# Patient Record
Sex: FEMALE | Race: WHITE | NOT HISPANIC OR LATINO | Employment: OTHER | URBAN - METROPOLITAN AREA
[De-identification: names, ages, dates, MRNs, and addresses within clinical notes are randomized per-mention and may not be internally consistent; named-entity substitution may affect disease eponyms.]

---

## 2018-12-05 ENCOUNTER — HOSPITAL ENCOUNTER (INPATIENT)
Facility: CLINIC | Age: 83
Setting detail: SURGERY ADMIT
End: 2018-12-05
Attending: ORTHOPAEDIC SURGERY | Admitting: ORTHOPAEDIC SURGERY
Payer: MEDICARE

## 2021-01-01 DIAGNOSIS — Z11.59 ENCOUNTER FOR SCREENING FOR OTHER VIRAL DISEASES: Primary | ICD-10-CM

## 2021-01-11 RX ORDER — IPRATROPIUM BROMIDE AND ALBUTEROL 20; 100 UG/1; UG/1
1 SPRAY, METERED RESPIRATORY (INHALATION) 4 TIMES DAILY
COMMUNITY
End: 2024-08-17

## 2021-01-11 RX ORDER — LOSARTAN POTASSIUM AND HYDROCHLOROTHIAZIDE 12.5; 1 MG/1; MG/1
1 TABLET ORAL DAILY
COMMUNITY

## 2021-01-11 RX ORDER — ALENDRONATE SODIUM 70 MG/1
70 TABLET ORAL
COMMUNITY
End: 2024-08-17

## 2021-01-12 ASSESSMENT — MIFFLIN-ST. JEOR: SCORE: 1005.87

## 2021-01-16 ENCOUNTER — HOSPITAL ENCOUNTER (OUTPATIENT)
Dept: LAB | Facility: CLINIC | Age: 86
Discharge: HOME | End: 2021-01-16
Attending: ORTHOPAEDIC SURGERY | Admitting: ORTHOPAEDIC SURGERY
Payer: MEDICARE

## 2021-01-16 DIAGNOSIS — Z11.59 ENCOUNTER FOR SCREENING FOR OTHER VIRAL DISEASES: ICD-10-CM

## 2021-01-16 LAB
SARS-COV-2 RNA RESP QL NAA+PROBE: NORMAL
SPECIMEN SOURCE: NORMAL

## 2021-01-16 PROCEDURE — 87635 SARS-COV-2 COVID-19 AMP PRB: CPT | Performed by: ORTHOPAEDIC SURGERY

## 2021-01-17 LAB
LABORATORY COMMENT REPORT: NORMAL
SARS-COV-2 RNA RESP QL NAA+PROBE: NEGATIVE
SPECIMEN SOURCE: NORMAL

## 2021-01-19 ENCOUNTER — HOSPITAL ENCOUNTER (OUTPATIENT)
Facility: CLINIC | Age: 86
Discharge: HOME | End: 2021-01-20
Attending: ORTHOPAEDIC SURGERY | Admitting: ORTHOPAEDIC SURGERY
Payer: MEDICARE

## 2021-01-19 ENCOUNTER — APPOINTMENT (OUTPATIENT)
Dept: GENERAL RADIOLOGY | Facility: CLINIC | Age: 86
End: 2021-01-19
Attending: ORTHOPAEDIC SURGERY
Payer: MEDICARE

## 2021-01-19 ENCOUNTER — ANESTHESIA (OUTPATIENT)
Dept: SURGERY | Facility: CLINIC | Age: 86
End: 2021-01-19
Payer: MEDICARE

## 2021-01-19 ENCOUNTER — ANESTHESIA EVENT (OUTPATIENT)
Dept: SURGERY | Facility: CLINIC | Age: 86
End: 2021-01-19
Payer: MEDICARE

## 2021-01-19 DIAGNOSIS — M19.011 PRIMARY OSTEOARTHRITIS OF RIGHT SHOULDER: Primary | ICD-10-CM

## 2021-01-19 LAB
ABO + RH BLD: NORMAL
ABO + RH BLD: NORMAL
BLD GP AB SCN SERPL QL: NORMAL
BLOOD BANK CMNT PATIENT-IMP: NORMAL
SPECIMEN EXP DATE BLD: NORMAL

## 2021-01-19 PROCEDURE — 86850 RBC ANTIBODY SCREEN: CPT | Performed by: ANESTHESIOLOGY

## 2021-01-19 PROCEDURE — C1713 ANCHOR/SCREW BN/BN,TIS/BN: HCPCS | Performed by: ORTHOPAEDIC SURGERY

## 2021-01-19 PROCEDURE — 258N000003 HC RX IP 258 OP 636: Performed by: ORTHOPAEDIC SURGERY

## 2021-01-19 PROCEDURE — 999N000156 HC STATISTIC RCP CONSULT EA 30 MIN

## 2021-01-19 PROCEDURE — C1776 JOINT DEVICE (IMPLANTABLE): HCPCS | Performed by: ORTHOPAEDIC SURGERY

## 2021-01-19 PROCEDURE — 999N000141 HC STATISTIC PRE-PROCEDURE NURSING ASSESSMENT: Performed by: ORTHOPAEDIC SURGERY

## 2021-01-19 PROCEDURE — 86901 BLOOD TYPING SEROLOGIC RH(D): CPT | Performed by: ANESTHESIOLOGY

## 2021-01-19 PROCEDURE — 710N000009 HC RECOVERY PHASE 1, LEVEL 1, PER MIN: Performed by: ORTHOPAEDIC SURGERY

## 2021-01-19 PROCEDURE — 250N000011 HC RX IP 250 OP 636: Performed by: ANESTHESIOLOGY

## 2021-01-19 PROCEDURE — 250N000011 HC RX IP 250 OP 636: Performed by: PHYSICIAN ASSISTANT

## 2021-01-19 PROCEDURE — 999N000179 XR SURGERY CARM FLUORO LESS THAN 5 MIN W STILLS: Mod: TC

## 2021-01-19 PROCEDURE — 258N000003 HC RX IP 258 OP 636: Performed by: ANESTHESIOLOGY

## 2021-01-19 PROCEDURE — 36415 COLL VENOUS BLD VENIPUNCTURE: CPT | Performed by: ANESTHESIOLOGY

## 2021-01-19 PROCEDURE — 250N000009 HC RX 250: Performed by: NURSE ANESTHETIST, CERTIFIED REGISTERED

## 2021-01-19 PROCEDURE — 272N000001 HC OR GENERAL SUPPLY STERILE: Performed by: ORTHOPAEDIC SURGERY

## 2021-01-19 PROCEDURE — 370N000017 HC ANESTHESIA TECHNICAL FEE, PER MIN: Performed by: ORTHOPAEDIC SURGERY

## 2021-01-19 PROCEDURE — 250N000013 HC RX MED GY IP 250 OP 250 PS 637: Mod: GY | Performed by: ANESTHESIOLOGY

## 2021-01-19 PROCEDURE — 250N000013 HC RX MED GY IP 250 OP 250 PS 637: Performed by: PHYSICIAN ASSISTANT

## 2021-01-19 PROCEDURE — 271N000001 HC OR GENERAL SUPPLY NON-STERILE: Performed by: ORTHOPAEDIC SURGERY

## 2021-01-19 PROCEDURE — 86900 BLOOD TYPING SEROLOGIC ABO: CPT | Performed by: ANESTHESIOLOGY

## 2021-01-19 PROCEDURE — 360N000084 HC SURGERY LEVEL 4 W/ FLUORO, PER MIN: Performed by: ORTHOPAEDIC SURGERY

## 2021-01-19 PROCEDURE — 250N000013 HC RX MED GY IP 250 OP 250 PS 637: Mod: GY | Performed by: ORTHOPAEDIC SURGERY

## 2021-01-19 PROCEDURE — 250N000011 HC RX IP 250 OP 636: Performed by: NURSE ANESTHETIST, CERTIFIED REGISTERED

## 2021-01-19 PROCEDURE — 250N000009 HC RX 250: Performed by: ANESTHESIOLOGY

## 2021-01-19 PROCEDURE — 250N000011 HC RX IP 250 OP 636: Performed by: ORTHOPAEDIC SURGERY

## 2021-01-19 DEVICE — IMPLANTABLE DEVICE
Type: IMPLANTABLE DEVICE | Site: SHOULDER | Status: FUNCTIONAL
Brand: COMPREHENSIVE® REVERSE SHOULDER

## 2021-01-19 DEVICE — IMPLANTABLE DEVICE
Type: IMPLANTABLE DEVICE | Site: SHOULDER | Status: FUNCTIONAL
Brand: COMPREHENSIVE REVERSE SHOULDER

## 2021-01-19 RX ORDER — ALBUTEROL SULFATE 90 UG/1
1-2 AEROSOL, METERED RESPIRATORY (INHALATION) EVERY 4 HOURS PRN
Status: DISCONTINUED | OUTPATIENT
Start: 2021-01-19 | End: 2021-01-20 | Stop reason: HOSPADM

## 2021-01-19 RX ORDER — BISACODYL 10 MG
10 SUPPOSITORY, RECTAL RECTAL DAILY PRN
Status: DISCONTINUED | OUTPATIENT
Start: 2021-01-19 | End: 2021-01-20 | Stop reason: HOSPADM

## 2021-01-19 RX ORDER — SODIUM CHLORIDE, SODIUM LACTATE, POTASSIUM CHLORIDE, CALCIUM CHLORIDE 600; 310; 30; 20 MG/100ML; MG/100ML; MG/100ML; MG/100ML
INJECTION, SOLUTION INTRAVENOUS CONTINUOUS
Status: DISCONTINUED | OUTPATIENT
Start: 2021-01-19 | End: 2021-01-19 | Stop reason: HOSPADM

## 2021-01-19 RX ORDER — FENTANYL CITRATE 50 UG/ML
25-50 INJECTION, SOLUTION INTRAMUSCULAR; INTRAVENOUS
Status: DISCONTINUED | OUTPATIENT
Start: 2021-01-19 | End: 2021-01-19 | Stop reason: HOSPADM

## 2021-01-19 RX ORDER — NALOXONE HYDROCHLORIDE 0.4 MG/ML
0.4 INJECTION, SOLUTION INTRAMUSCULAR; INTRAVENOUS; SUBCUTANEOUS
Status: DISCONTINUED | OUTPATIENT
Start: 2021-01-19 | End: 2021-01-20 | Stop reason: HOSPADM

## 2021-01-19 RX ORDER — NALOXONE HYDROCHLORIDE 0.4 MG/ML
0.4 INJECTION, SOLUTION INTRAMUSCULAR; INTRAVENOUS; SUBCUTANEOUS
Status: DISCONTINUED | OUTPATIENT
Start: 2021-01-19 | End: 2021-01-19 | Stop reason: HOSPADM

## 2021-01-19 RX ORDER — ONDANSETRON 2 MG/ML
4 INJECTION INTRAMUSCULAR; INTRAVENOUS EVERY 6 HOURS PRN
Status: DISCONTINUED | OUTPATIENT
Start: 2021-01-19 | End: 2021-01-20 | Stop reason: HOSPADM

## 2021-01-19 RX ORDER — TRANEXAMIC ACID 650 MG/1
1950 TABLET ORAL ONCE
Status: COMPLETED | OUTPATIENT
Start: 2021-01-19 | End: 2021-01-19

## 2021-01-19 RX ORDER — CELECOXIB 200 MG/1
200 CAPSULE ORAL DAILY
Qty: 14 CAPSULE | Refills: 0 | Status: SHIPPED | OUTPATIENT
Start: 2021-01-19 | End: 2024-08-17

## 2021-01-19 RX ORDER — PROCHLORPERAZINE MALEATE 5 MG
5 TABLET ORAL EVERY 6 HOURS PRN
Status: DISCONTINUED | OUTPATIENT
Start: 2021-01-19 | End: 2021-01-20 | Stop reason: HOSPADM

## 2021-01-19 RX ORDER — FENTANYL CITRATE-0.9 % NACL/PF 10 MCG/ML
PLASTIC BAG, INJECTION (ML) INTRAVENOUS CONTINUOUS PRN
Status: DISCONTINUED | OUTPATIENT
Start: 2021-01-19 | End: 2021-01-19

## 2021-01-19 RX ORDER — VANCOMYCIN HYDROCHLORIDE 1 G/20ML
INJECTION, POWDER, LYOPHILIZED, FOR SOLUTION INTRAVENOUS PRN
Status: DISCONTINUED | OUTPATIENT
Start: 2021-01-19 | End: 2021-01-19 | Stop reason: HOSPADM

## 2021-01-19 RX ORDER — ALBUTEROL SULFATE 0.83 MG/ML
2.5 SOLUTION RESPIRATORY (INHALATION) EVERY 4 HOURS PRN
Status: DISCONTINUED | OUTPATIENT
Start: 2021-01-19 | End: 2021-01-19 | Stop reason: HOSPADM

## 2021-01-19 RX ORDER — LIDOCAINE HYDROCHLORIDE 40 MG/ML
SOLUTION TOPICAL PRN
Status: DISCONTINUED | OUTPATIENT
Start: 2021-01-19 | End: 2021-01-19

## 2021-01-19 RX ORDER — LABETALOL 20 MG/4 ML (5 MG/ML) INTRAVENOUS SYRINGE
10
Status: DISCONTINUED | OUTPATIENT
Start: 2021-01-19 | End: 2021-01-19 | Stop reason: HOSPADM

## 2021-01-19 RX ORDER — LIDOCAINE 40 MG/G
CREAM TOPICAL
Status: DISCONTINUED | OUTPATIENT
Start: 2021-01-19 | End: 2021-01-19 | Stop reason: HOSPADM

## 2021-01-19 RX ORDER — DOCUSATE SODIUM 100 MG/1
100 CAPSULE, LIQUID FILLED ORAL 2 TIMES DAILY
Status: DISCONTINUED | OUTPATIENT
Start: 2021-01-19 | End: 2021-01-20 | Stop reason: HOSPADM

## 2021-01-19 RX ORDER — ONDANSETRON 2 MG/ML
4 INJECTION INTRAMUSCULAR; INTRAVENOUS EVERY 30 MIN PRN
Status: DISCONTINUED | OUTPATIENT
Start: 2021-01-19 | End: 2021-01-19 | Stop reason: HOSPADM

## 2021-01-19 RX ORDER — GABAPENTIN 100 MG/1
100-200 CAPSULE ORAL
Status: DISCONTINUED | OUTPATIENT
Start: 2021-01-19 | End: 2021-01-20 | Stop reason: HOSPADM

## 2021-01-19 RX ORDER — HYDRALAZINE HYDROCHLORIDE 20 MG/ML
2.5-5 INJECTION INTRAMUSCULAR; INTRAVENOUS EVERY 10 MIN PRN
Status: DISCONTINUED | OUTPATIENT
Start: 2021-01-19 | End: 2021-01-19 | Stop reason: HOSPADM

## 2021-01-19 RX ORDER — AMOXICILLIN 250 MG
1-2 CAPSULE ORAL 2 TIMES DAILY
Qty: 30 TABLET | Refills: 0 | Status: SHIPPED | OUTPATIENT
Start: 2021-01-19

## 2021-01-19 RX ORDER — GLYCOPYRROLATE 0.2 MG/ML
INJECTION, SOLUTION INTRAMUSCULAR; INTRAVENOUS PRN
Status: DISCONTINUED | OUTPATIENT
Start: 2021-01-19 | End: 2021-01-19

## 2021-01-19 RX ORDER — CEFAZOLIN SODIUM 2 G/100ML
2 INJECTION, SOLUTION INTRAVENOUS
Status: COMPLETED | OUTPATIENT
Start: 2021-01-19 | End: 2021-01-19

## 2021-01-19 RX ORDER — ACETAMINOPHEN 325 MG/1
650 TABLET ORAL EVERY 4 HOURS PRN
Qty: 100 TABLET | Refills: 0 | Status: SHIPPED | OUTPATIENT
Start: 2021-01-19 | End: 2024-08-17

## 2021-01-19 RX ORDER — ACETAMINOPHEN 325 MG/1
650 TABLET ORAL EVERY 4 HOURS PRN
Status: DISCONTINUED | OUTPATIENT
Start: 2021-01-22 | End: 2021-01-20 | Stop reason: HOSPADM

## 2021-01-19 RX ORDER — TRAMADOL HYDROCHLORIDE 50 MG/1
50 TABLET ORAL EVERY 6 HOURS PRN
Status: DISCONTINUED | OUTPATIENT
Start: 2021-01-19 | End: 2021-01-20 | Stop reason: HOSPADM

## 2021-01-19 RX ORDER — NALOXONE HYDROCHLORIDE 0.4 MG/ML
0.2 INJECTION, SOLUTION INTRAMUSCULAR; INTRAVENOUS; SUBCUTANEOUS
Status: DISCONTINUED | OUTPATIENT
Start: 2021-01-19 | End: 2021-01-20 | Stop reason: HOSPADM

## 2021-01-19 RX ORDER — CEFAZOLIN SODIUM 1 G/3ML
1 INJECTION, POWDER, FOR SOLUTION INTRAMUSCULAR; INTRAVENOUS EVERY 8 HOURS
Status: COMPLETED | OUTPATIENT
Start: 2021-01-19 | End: 2021-01-20

## 2021-01-19 RX ORDER — CLOPIDOGREL BISULFATE 75 MG/1
75 TABLET ORAL DAILY
Status: DISCONTINUED | OUTPATIENT
Start: 2021-01-19 | End: 2021-01-20 | Stop reason: HOSPADM

## 2021-01-19 RX ORDER — HYDROMORPHONE HYDROCHLORIDE 1 MG/ML
.3-.5 INJECTION, SOLUTION INTRAMUSCULAR; INTRAVENOUS; SUBCUTANEOUS EVERY 10 MIN PRN
Status: DISCONTINUED | OUTPATIENT
Start: 2021-01-19 | End: 2021-01-19 | Stop reason: HOSPADM

## 2021-01-19 RX ORDER — CELECOXIB 200 MG/1
200 CAPSULE ORAL ONCE
Status: COMPLETED | OUTPATIENT
Start: 2021-01-19 | End: 2021-01-19

## 2021-01-19 RX ORDER — SIMVASTATIN 40 MG
40 TABLET ORAL AT BEDTIME
Status: DISCONTINUED | OUTPATIENT
Start: 2021-01-19 | End: 2021-01-20 | Stop reason: HOSPADM

## 2021-01-19 RX ORDER — ONDANSETRON 4 MG/1
4 TABLET, ORALLY DISINTEGRATING ORAL EVERY 6 HOURS PRN
Status: DISCONTINUED | OUTPATIENT
Start: 2021-01-19 | End: 2021-01-20 | Stop reason: HOSPADM

## 2021-01-19 RX ORDER — ACETAMINOPHEN 325 MG/1
975 TABLET ORAL ONCE
Status: COMPLETED | OUTPATIENT
Start: 2021-01-19 | End: 2021-01-19

## 2021-01-19 RX ORDER — DEXAMETHASONE SODIUM PHOSPHATE 4 MG/ML
INJECTION, SOLUTION INTRA-ARTICULAR; INTRALESIONAL; INTRAMUSCULAR; INTRAVENOUS; SOFT TISSUE PRN
Status: DISCONTINUED | OUTPATIENT
Start: 2021-01-19 | End: 2021-01-19

## 2021-01-19 RX ORDER — HYDROXYZINE HYDROCHLORIDE 10 MG/1
10 TABLET, FILM COATED ORAL EVERY 6 HOURS PRN
Qty: 30 TABLET | Refills: 0 | Status: SHIPPED | OUTPATIENT
Start: 2021-01-19 | End: 2024-08-17

## 2021-01-19 RX ORDER — ALENDRONATE SODIUM 70 MG/1
70 TABLET ORAL
Status: DISCONTINUED | OUTPATIENT
Start: 2021-01-25 | End: 2021-01-20 | Stop reason: HOSPADM

## 2021-01-19 RX ORDER — LIDOCAINE HYDROCHLORIDE 10 MG/ML
INJECTION, SOLUTION INFILTRATION; PERINEURAL PRN
Status: DISCONTINUED | OUTPATIENT
Start: 2021-01-19 | End: 2021-01-19

## 2021-01-19 RX ORDER — AMOXICILLIN 250 MG
1 CAPSULE ORAL 2 TIMES DAILY
Status: DISCONTINUED | OUTPATIENT
Start: 2021-01-19 | End: 2021-01-20 | Stop reason: HOSPADM

## 2021-01-19 RX ORDER — ONDANSETRON 2 MG/ML
INJECTION INTRAMUSCULAR; INTRAVENOUS PRN
Status: DISCONTINUED | OUTPATIENT
Start: 2021-01-19 | End: 2021-01-19

## 2021-01-19 RX ORDER — TRAMADOL HYDROCHLORIDE 50 MG/1
50 TABLET ORAL EVERY 6 HOURS PRN
Qty: 30 TABLET | Refills: 0 | Status: SHIPPED | OUTPATIENT
Start: 2021-01-19

## 2021-01-19 RX ORDER — FENTANYL CITRATE 50 UG/ML
INJECTION, SOLUTION INTRAMUSCULAR; INTRAVENOUS PRN
Status: DISCONTINUED | OUTPATIENT
Start: 2021-01-19 | End: 2021-01-19

## 2021-01-19 RX ORDER — HYDROMORPHONE HYDROCHLORIDE 1 MG/ML
0.2 INJECTION, SOLUTION INTRAMUSCULAR; INTRAVENOUS; SUBCUTANEOUS
Status: DISCONTINUED | OUTPATIENT
Start: 2021-01-19 | End: 2021-01-20 | Stop reason: HOSPADM

## 2021-01-19 RX ORDER — BUPIVACAINE HYDROCHLORIDE 7.5 MG/ML
INJECTION, SOLUTION EPIDURAL; RETROBULBAR PRN
Status: DISCONTINUED | OUTPATIENT
Start: 2021-01-19 | End: 2021-01-19

## 2021-01-19 RX ORDER — PROPOFOL 10 MG/ML
INJECTION, EMULSION INTRAVENOUS PRN
Status: DISCONTINUED | OUTPATIENT
Start: 2021-01-19 | End: 2021-01-19

## 2021-01-19 RX ORDER — NALOXONE HYDROCHLORIDE 0.4 MG/ML
0.2 INJECTION, SOLUTION INTRAMUSCULAR; INTRAVENOUS; SUBCUTANEOUS
Status: DISCONTINUED | OUTPATIENT
Start: 2021-01-19 | End: 2021-01-19 | Stop reason: HOSPADM

## 2021-01-19 RX ORDER — MEPERIDINE HYDROCHLORIDE 25 MG/ML
12.5 INJECTION INTRAMUSCULAR; INTRAVENOUS; SUBCUTANEOUS
Status: DISCONTINUED | OUTPATIENT
Start: 2021-01-19 | End: 2021-01-19 | Stop reason: HOSPADM

## 2021-01-19 RX ORDER — POLYETHYLENE GLYCOL 3350 17 G/17G
17 POWDER, FOR SOLUTION ORAL DAILY
Status: DISCONTINUED | OUTPATIENT
Start: 2021-01-20 | End: 2021-01-20 | Stop reason: HOSPADM

## 2021-01-19 RX ORDER — ACETAMINOPHEN 325 MG/1
975 TABLET ORAL EVERY 8 HOURS
Status: DISCONTINUED | OUTPATIENT
Start: 2021-01-19 | End: 2021-01-20 | Stop reason: HOSPADM

## 2021-01-19 RX ORDER — SODIUM CHLORIDE, SODIUM LACTATE, POTASSIUM CHLORIDE, CALCIUM CHLORIDE 600; 310; 30; 20 MG/100ML; MG/100ML; MG/100ML; MG/100ML
INJECTION, SOLUTION INTRAVENOUS CONTINUOUS
Status: DISCONTINUED | OUTPATIENT
Start: 2021-01-19 | End: 2021-01-20

## 2021-01-19 RX ORDER — LIDOCAINE 40 MG/G
CREAM TOPICAL
Status: DISCONTINUED | OUTPATIENT
Start: 2021-01-19 | End: 2021-01-20 | Stop reason: HOSPADM

## 2021-01-19 RX ORDER — HYDROMORPHONE HYDROCHLORIDE 1 MG/ML
0.4 INJECTION, SOLUTION INTRAMUSCULAR; INTRAVENOUS; SUBCUTANEOUS
Status: DISCONTINUED | OUTPATIENT
Start: 2021-01-19 | End: 2021-01-20 | Stop reason: HOSPADM

## 2021-01-19 RX ORDER — CEFAZOLIN SODIUM 1 G/3ML
1 INJECTION, POWDER, FOR SOLUTION INTRAMUSCULAR; INTRAVENOUS SEE ADMIN INSTRUCTIONS
Status: DISCONTINUED | OUTPATIENT
Start: 2021-01-19 | End: 2021-01-19 | Stop reason: HOSPADM

## 2021-01-19 RX ORDER — ONDANSETRON 4 MG/1
4 TABLET, ORALLY DISINTEGRATING ORAL EVERY 30 MIN PRN
Status: DISCONTINUED | OUTPATIENT
Start: 2021-01-19 | End: 2021-01-19 | Stop reason: HOSPADM

## 2021-01-19 RX ORDER — OXYBUTYNIN CHLORIDE 5 MG/1
10 TABLET, EXTENDED RELEASE ORAL EVERY EVENING
Status: DISCONTINUED | OUTPATIENT
Start: 2021-01-19 | End: 2021-01-20 | Stop reason: HOSPADM

## 2021-01-19 RX ORDER — IPRATROPIUM BROMIDE AND ALBUTEROL SULFATE 2.5; .5 MG/3ML; MG/3ML
3 SOLUTION RESPIRATORY (INHALATION) ONCE
Status: COMPLETED | OUTPATIENT
Start: 2021-01-19 | End: 2021-01-19

## 2021-01-19 RX ORDER — HYDROXYZINE HYDROCHLORIDE 10 MG/1
10 TABLET, FILM COATED ORAL EVERY 6 HOURS PRN
Status: DISCONTINUED | OUTPATIENT
Start: 2021-01-19 | End: 2021-01-20 | Stop reason: HOSPADM

## 2021-01-19 RX ORDER — NEOSTIGMINE METHYLSULFATE 1 MG/ML
VIAL (ML) INJECTION PRN
Status: DISCONTINUED | OUTPATIENT
Start: 2021-01-19 | End: 2021-01-19

## 2021-01-19 RX ADMIN — DOCUSATE SODIUM 50 MG AND SENNOSIDES 8.6 MG 1 TABLET: 8.6; 5 TABLET, FILM COATED ORAL at 12:25

## 2021-01-19 RX ADMIN — DOCUSATE SODIUM 100 MG: 100 CAPSULE, LIQUID FILLED ORAL at 21:30

## 2021-01-19 RX ADMIN — CEFAZOLIN SODIUM 2 G: 2 INJECTION, SOLUTION INTRAVENOUS at 08:02

## 2021-01-19 RX ADMIN — ACETAMINOPHEN 975 MG: 325 TABLET, FILM COATED ORAL at 07:14

## 2021-01-19 RX ADMIN — GLYCOPYRROLATE 0.4 MG: 0.2 INJECTION, SOLUTION INTRAMUSCULAR; INTRAVENOUS at 09:30

## 2021-01-19 RX ADMIN — LIDOCAINE HYDROCHLORIDE 2 ML: 40 SOLUTION TOPICAL at 07:47

## 2021-01-19 RX ADMIN — BUPIVACAINE HYDROCHLORIDE 12 ML: 7.5 INJECTION, SOLUTION EPIDURAL; RETROBULBAR at 07:28

## 2021-01-19 RX ADMIN — DOCUSATE SODIUM 100 MG: 100 CAPSULE, LIQUID FILLED ORAL at 12:26

## 2021-01-19 RX ADMIN — Medication 40 MCG/MIN: at 08:00

## 2021-01-19 RX ADMIN — SODIUM CHLORIDE, POTASSIUM CHLORIDE, SODIUM LACTATE AND CALCIUM CHLORIDE: 600; 310; 30; 20 INJECTION, SOLUTION INTRAVENOUS at 07:34

## 2021-01-19 RX ADMIN — FENTANYL CITRATE 100 MCG: 50 INJECTION, SOLUTION INTRAMUSCULAR; INTRAVENOUS at 07:40

## 2021-01-19 RX ADMIN — LIDOCAINE HYDROCHLORIDE 50 MG: 10 INJECTION, SOLUTION INFILTRATION; PERINEURAL at 07:40

## 2021-01-19 RX ADMIN — PROPOFOL 120 MG: 10 INJECTION, EMULSION INTRAVENOUS at 07:40

## 2021-01-19 RX ADMIN — DOCUSATE SODIUM 50 MG AND SENNOSIDES 8.6 MG 1 TABLET: 8.6; 5 TABLET, FILM COATED ORAL at 21:32

## 2021-01-19 RX ADMIN — Medication 3 MG: at 09:30

## 2021-01-19 RX ADMIN — HYDROCHLOROTHIAZIDE: 12.5 CAPSULE ORAL at 12:26

## 2021-01-19 RX ADMIN — SODIUM CHLORIDE, POTASSIUM CHLORIDE, SODIUM LACTATE AND CALCIUM CHLORIDE: 600; 310; 30; 20 INJECTION, SOLUTION INTRAVENOUS at 12:29

## 2021-01-19 RX ADMIN — ONDANSETRON 4 MG: 2 INJECTION INTRAMUSCULAR; INTRAVENOUS at 10:15

## 2021-01-19 RX ADMIN — ROCURONIUM BROMIDE 40 MG: 10 INJECTION INTRAVENOUS at 07:42

## 2021-01-19 RX ADMIN — ACETAMINOPHEN 975 MG: 325 TABLET, FILM COATED ORAL at 06:16

## 2021-01-19 RX ADMIN — SIMVASTATIN 40 MG: 40 TABLET, FILM COATED ORAL at 21:31

## 2021-01-19 RX ADMIN — TRANEXAMIC ACID 1950 MG: 650 TABLET ORAL at 06:16

## 2021-01-19 RX ADMIN — ONDANSETRON HYDROCHLORIDE 4 MG: 2 INJECTION, SOLUTION INTRAVENOUS at 07:40

## 2021-01-19 RX ADMIN — DEXAMETHASONE SODIUM PHOSPHATE 4 MG: 4 INJECTION, SOLUTION INTRA-ARTICULAR; INTRALESIONAL; INTRAMUSCULAR; INTRAVENOUS; SOFT TISSUE at 07:40

## 2021-01-19 RX ADMIN — CEFAZOLIN 1 G: 1 INJECTION, POWDER, FOR SOLUTION INTRAMUSCULAR; INTRAVENOUS at 16:22

## 2021-01-19 RX ADMIN — IPRATROPIUM BROMIDE AND ALBUTEROL SULFATE 3 ML: .5; 3 SOLUTION RESPIRATORY (INHALATION) at 06:50

## 2021-01-19 RX ADMIN — CELECOXIB 200 MG: 200 CAPSULE ORAL at 07:13

## 2021-01-19 RX ADMIN — ACETAMINOPHEN 975 MG: 325 TABLET, FILM COATED ORAL at 16:22

## 2021-01-19 RX ADMIN — OXYBUTYNIN CHLORIDE 10 MG: 5 TABLET, EXTENDED RELEASE ORAL at 21:31

## 2021-01-19 RX ADMIN — CLOPIDOGREL BISULFATE 75 MG: 75 TABLET ORAL at 12:26

## 2021-01-19 ASSESSMENT — COPD QUESTIONNAIRES
COPD: 1
CAT_SEVERITY: MODERATE

## 2021-01-19 ASSESSMENT — MIFFLIN-ST. JEOR: SCORE: 1014.94

## 2021-01-19 NOTE — ANESTHESIA PROCEDURE NOTES
Pre-Procedure   Staff -   Anesthesiologist:  Itz Benoit MD  Performed By: anesthesiologist  Referred By: monica  Location: pre-op  Pre-Anesthestic Checklist: patient identified, IV checked, site marked, risks and benefits discussed, informed consent, monitors and equipment checked, pre-op evaluation, at physician/surgeon's request and post-op pain management  Timeout:  Correct Patient: Yes   Correct Procedure: Yes   Correct Site: Yes   Correct Position: Yes   Correct Laterality: Yes   Site Marked: Yes    Procedure Documentation  Procedure: Other (Suprascapular nerve block)  Diagnosis: ARTHRITIS  Laterality: right  Patient Position:sitting  Patient Prep/Sterile Barriers: sterile gloves, mask, Chloraprep  Local skin infiltrated with 2 mL of 2% lidocaine.   Needle type: insulated and short bevel  Needle Gauge: 21.   Needle Length (Inches) 4   Ultrasound guided, Ultrasound used to identify targeted nerve, plexus, vascular marker, or fascial plane and place a needle adjacent to it in real-time, Ultrasound was used to visualize the spread of anesthetic in close proximity to the above referenced structure  The nerve(s) appeared anatomically normal, There were no apparent abnormal pathologic findings    Assessment/Narrative      The placement was negative for: blood aspirated, painful injection and site bleeding  Paresthesias: No.  Bolus given via needle..   Secured via.   Insertion/Infusion Method: Single Shot  Complications: none  Injection made incrementally with aspirations every 2 mL.  Comments:  5 ml of 0.75% bupi anterior approach suprascapular nerve.  Phrenic nerve sparing shoulder block

## 2021-01-19 NOTE — ANESTHESIA POSTPROCEDURE EVALUATION
Patient: Azra Christine    Procedure(s):  Right reverse total shoulder arthroplasty    Diagnosis:DJD (degenerative joint disease) [M19.90]  Diagnosis Additional Information: No value filed.    Anesthesia Type:  General    Note:  Disposition: Inpatient   Postop Pain Control: Uneventful            Sign Out: Well controlled pain   PONV: No   Neuro/Psych: Uneventful            Sign Out: Acceptable/Baseline neuro status   Airway/Respiratory: Uneventful            Sign Out: Acceptable/Baseline resp. status   CV/Hemodynamics: Uneventful            Sign Out: Acceptable CV status   Other NRE: NONE   DID A NON-ROUTINE EVENT OCCUR? No    Event details/Postop Comments:    Phrenic nerve sparing shoulder block working well.  No apparent respiratory compromise.          Last vitals:  Vitals:    01/19/21 1055 01/19/21 1100 01/19/21 1134   BP:   (P) 136/51   Pulse: 58 53 (P) 63   Resp: 20 16 (P) 20   Temp:   (P) 99  F (37.2  C)   SpO2: 95% 95% (P) 97%       Electronically Signed By: Itz Benoit MD  January 19, 2021  11:44 AM

## 2021-01-19 NOTE — ANESTHESIA PREPROCEDURE EVALUATION
Anesthesia Pre-Procedure Evaluation    Patient: Azra Christine   MRN: [id number] : 1935        Preoperative Diagnosis: DJD (degenerative joint disease) [M19.90]   Procedure : Procedure(s):  Right reverse total shoulder arthroplasty     Past Medical History:   Diagnosis Date     Bronchitis 2014     Colitis      COPD (chronic obstructive pulmonary disease) (H)      Dyspnea on exertion      Heart attack (H)      Hyperlipidaemia      Hypertension      Lumbar facet arthropathy      Noninfectious ileitis 2015    Bout of colitis.     Osteoarthritis      Stroke (H)     NO DEFICITS     Uncomplicated asthma      Urinary frequency     Rx: Oxybutinin      Past Surgical History:   Procedure Laterality Date     APPENDECTOMY       ARTHROPLASTY HIP ANTERIOR Right 2015    Procedure: ARTHROPLASTY HIP ANTERIOR;  Surgeon: Ozzie Alfredo MD;  Location: [facility] OR     CARDIAC SURGERY      3 Vessel CABG     ENT SURGERY      tonsillectomy     ENT SURGERY      blepharoplasty     EYE SURGERY      blepharoplasty     GENITOURINARY SURGERY      bladder repair     GYN SURGERY      hysterectomy     ORTHOPEDIC SURGERY      L TALISHA      No Known Allergies   Social History     Tobacco Use     Smoking status: Former Smoker     Smokeless tobacco: Never Used   Substance Use Topics     Alcohol use: Yes     Comment: occasionally      Wt Readings from Last 1 Encounters:   21 59.9 kg (132 lb)        Anesthesia Evaluation   Pt has had prior anesthetic. Type: General.    History of anesthetic complications   Some reported issues with post op extubation difficulty.    ROS/MED HX  ENT/Pulmonary:     (+) Moderate Persistent, asthma Treatment: Inhaler daily,  moderate,  COPD, recent URI, resolved,     Neurologic:     (+) CVA, with deficits, - inbalance.     Cardiovascular:     (+) Dyslipidemia hypertension--CAD -CABG--Taking blood thinners Pt has received instructions: Instructions Given to patient: plavix held 7 days. HUTSON.      METS/Exercise Tolerance:     Hematologic:  - neg hematologic  ROS     Musculoskeletal:   (+) arthritis,     GI/Hepatic:  - neg GI/hepatic ROS     Renal/Genitourinary:  - neg Renal ROS     Endo:  - neg endo ROS     Psychiatric/Substance Use:  - neg psychiatric ROS     Infectious Disease:  - neg infectious disease ROS     Malignancy:       Other:            Physical Exam    Airway        Mallampati: II   TM distance: > 3 FB   Neck ROM: full   Mouth opening: > 3 cm    Respiratory Devices and Support         Dental       (+) partials      Cardiovascular   cardiovascular exam normal       Rhythm and rate: regular and normal     Pulmonary   pulmonary exam normal        breath sounds clear to auscultation           OUTSIDE LABS:  CBC:   Lab Results   Component Value Date    WBC 8.3 07/24/2016    WBC 9.5 07/23/2016    HGB 12.6 07/24/2016    HGB 13.4 07/23/2016    HCT 38.8 07/24/2016    HCT 41.0 07/23/2016     07/24/2016     07/23/2016     BMP:   Lab Results   Component Value Date     07/24/2016     07/23/2016    POTASSIUM 4.1 07/24/2016    POTASSIUM 3.8 07/23/2016    CHLORIDE 104 07/24/2016    CHLORIDE 102 07/23/2016    CO2 27 07/24/2016    CO2 27 07/23/2016    BUN 16 07/24/2016    BUN 26 07/23/2016    CR 0.62 07/24/2016    CR 0.77 07/23/2016    GLC 99 07/24/2016     (H) 07/23/2016     COAGS: No results found for: PTT, INR, FIBR  POC: No results found for: BGM, HCG, HCGS  HEPATIC:   Lab Results   Component Value Date    ALBUMIN 3.8 07/23/2016    PROTTOTAL 7.0 07/23/2016    ALT 19 07/23/2016    AST 14 07/23/2016    ALKPHOS 64 07/23/2016    BILITOTAL 0.3 07/23/2016     OTHER:   Lab Results   Component Value Date    BENEDICTO 8.5 07/24/2016       Anesthesia Plan     History & Physical Review      ASA Status:  3. NPO Status:  NPO Appropriate. .  Plan for General with Intravenous induction. Device: ETT Maintenance will be Inhalation.         PONV prophylaxis:  Ondansetron (or other 5HT-3) and  Dexamethasone or Solumedrol.    Comments:   Discussed nerve block (interscalene) and high risk of additional respiratory compromise.  She is also high risk of post op issues related to pain control and opioid use.  After careful consideration, the best option would be a phrenic nerve sparing shoulder block (suprascapular and posterior cord block) to help with pain control while minimizing the risk to phenic nerve involvement.  There is still a small risk of phernic nerve involvement.  Patient understands and wishes to proceed.      The surgeon has given a verbal order transferring care of this patient to me for the performance of a regional analgesia block for post-op pain control. It is requested of me because I am uniquely trained and qualified to perform this block and the surgeon is neither trained nor qualified to perform this procedure..       Consents  Anesthesia Plan(s) and associated risks, benefits, and realistic alternatives discussed.    Questions answered and patient/representative(s) expressed understanding.    Discussed with:  Patient.       Extended Intubation/Ventilatory Support Discussed Yes (May require post op ICU if unable to wean from vent safely). No     Use of blood products discussed: No.          Postoperative Care  Postoperative pain management: IV analgesics, Peripheral nerve block (Single Shot), Oral pain medications and Multi-modal analgesia.           Itz Benoit MD

## 2021-01-19 NOTE — ANESTHESIA PROCEDURE NOTES
Pre-Procedure   Staff -   Anesthesiologist:  Itz Benoit MD  Performed By: anesthesiologist  Referred By: monica  Location: pre-op  Pre-Anesthestic Checklist: patient identified, IV checked, site marked, risks and benefits discussed, informed consent, monitors and equipment checked, pre-op evaluation, at physician/surgeon's request and post-op pain management  Timeout:  Correct Patient: Yes   Correct Procedure: Yes   Correct Site: Yes   Correct Position: Yes   Correct Laterality: Yes   Site Marked: Yes    Procedure Documentation  Procedure: Infraclavicular (posterior cord only)  Diagnosis: ARTHRITIS  Laterality: right  Patient Position:sitting  Patient Prep/Sterile Barriers: sterile gloves, mask, Chloraprep  Local skin infiltrated with 2 mL of 2% lidocaine.   Needle type: insulated and short bevel  Needle Gauge: 21.   Needle Length (Inches) 4   Ultrasound guided, Ultrasound used to identify targeted nerve, plexus, vascular marker, or fascial plane and place a needle adjacent to it in real-time, Ultrasound was used to visualize the spread of anesthetic in close proximity to the above referenced structure  The nerve(s) appeared anatomically normal, There were no apparent abnormal pathologic findings    Assessment/Narrative      The placement was negative for: blood aspirated, painful injection and site bleeding  Paresthesias: No.  Bolus given via needle..   Secured via.   Insertion/Infusion Method: Single Shot  Complications: none  Injection made incrementally with aspirations every 2 mL.  Comments:  7 ml of 0.75% bupi at posterior cord only.  Phrenic nerve sparing shoulder block

## 2021-01-19 NOTE — PLAN OF CARE
Patient vital signs are at baseline: Yes  Patient able to ambulate as they were prior to admission or with assist devices provided by therapies during their stay:  No,  Reason:  requiring assist of 2 to BSC  Patient MUST void prior to discharge:  No,  Reason:  voiding in small amts but picking up in volumes  Patient able to tolerate oral intake:  Yes  Pain has adequate pain control using Oral analgesics:  No,  Reason:  not requiring pain medications d/t exparel

## 2021-01-19 NOTE — PHARMACY-ADMISSION MEDICATION HISTORY
Medication reconciliation interview completed by pre-admitting nurse Laure Recio, reviewed by pharmacy. No further clarifications needed.       Prior to Admission medications    Medication Sig Last Dose Taking? Auth Provider          ACETAMINOPHEN PO Take 500-1,000 mg by mouth daily as needed  1/18/2021 at 2100 Yes Reported, Patient   albuterol (PROAIR HFA, PROVENTIL HFA, VENTOLIN HFA) 108 (90 BASE) MCG/ACT inhaler Inhale 1-2 puffs into the lungs every 4 hours as needed for shortness of breath / dyspnea or wheezing 1/19/2021 at Unknown time Yes Reported, Patient   alendronate (FOSAMAX) 70 MG tablet Take 70 mg by mouth every 7 days 1/18/2021 at Unknown time Yes Reported, Patient          CLOPIDOGREL BISULFATE PO Take 75 mg by mouth daily 1/12/2021 at Unknown time Yes Reported, Patient   fluticasone (FLOVENT HFA) 110 MCG/ACT inhaler Inhale 1 puff into the lungs 2 times daily  Past Week at Unknown time Yes Reported, Patient   GABAPENTIN PO Take 100-200 mg by mouth nightly as needed 1-3 capsules per night 1/18/2021 at Unknown time Yes Reported, Patient          ipratropium-albuterol (COMBIVENT RESPIMAT)  MCG/ACT inhaler Inhale 1 puff into the lungs 4 times daily 1/19/2021 at Unknown time Yes Reported, Patient   losartan-hydrochlorothiazide (HYZAAR) 100-12.5 MG tablet Take 1 tablet by mouth daily 1/18/2021 at Unknown time Yes Reported, Patient   oxybutynin (DITROPAN XL) 10 MG 24 hr tablet Take 10 mg by mouth every evening 1/18/2021 at Unknown time Yes Unknown, Entered By History          Simvastatin (ZOCOR PO) Take 40 mg by mouth At Bedtime  1/18/2021 at Unknown time Yes Reported, Patient

## 2021-01-19 NOTE — ANESTHESIA CARE TRANSFER NOTE
Patient: Azra Christine    Procedure(s):  Right reverse total shoulder arthroplasty    Diagnosis: DJD (degenerative joint disease) [M19.90]  Diagnosis Additional Information: No value filed.    Anesthesia Type:   General     Note:    Oropharynx: spontaneously breathing  Level of Consciousness: drowsy  Oxygen Supplementation: face mask  Level of Supplemental Oxygen: 6LPM  Independent Airway: airway patency satisfactory and stable  Dentition: dentition unchanged  Vital Signs Stable: post-procedure vital signs reviewed and stable  Report to RN Given: handoff report given  Patient transferred to: PACU    Handoff Report: Identifed the Patient, Identified the Reponsible Provider, Reviewed the pertinent medical history, Discussed the surgical course, Reviewed Intra-OP anesthesia mangement and issues during anesthesia, Set expectations for post-procedure period and Allowed opportunity for questions and acknowledgement of understanding      Vitals: (Last set prior to Anesthesia Care Transfer)  CRNA VITALS  1/19/2021 0918 - 1/19/2021 0956      1/19/2021             NIBP:  170/84    NIBP Mean:  108        Electronically Signed By: LUCIANO Penaloza CRNA  January 19, 2021  9:56 AM

## 2021-01-19 NOTE — OR NURSING
Bladder scanned in PACU for 447. Patient reports she feels urge to urinate and would like to use bathroom once on the floor. Declined bedpan or straight cath at this time.

## 2021-01-19 NOTE — ANESTHESIA PROCEDURE NOTES
Airway   Date/Time: 1/19/2021 7:47 AM   Patient location during procedure: OR  Staff -   Performed By: CRNA    Consent for Airway   Urgency: elective    Indications and Patient Condition  Indications for airway management: oli-procedural  Induction type:intravenousMask difficulty assessment: 1 - vent by mask    Final Airway Details  Final airway type: endotracheal airway  Successful airway:ETT - single  Endotracheal Airway Details   ETT size (mm): 7.0  Cuffed: yes  Cuff volume (mL): 10  Successful intubation technique: direct laryngoscopy  Grade View of Cords: 2  Adjucts: stylet  Measured from: lips  Secured at (cm): 22  Secured with: plastic tape  Bite block used: Molar    Post intubation assessment   Placement verified by: capnometry, equal breath sounds and chest rise   Number of attempts at approach: 1  Number of other approaches attempted: 0  Secured with:plastic tape  Ease of procedure: easy  Dentition: Intact and Unchanged

## 2021-01-19 NOTE — OP NOTE
[facility] Orthopedic Operative Note    Pre-operative diagnosis: Rotator cuff arthropathy with Osteoarthritis of the right shoulder   Post-operative diagnosis: same   Procedure: Reverse Total Shoulder Arthoplasty of the right shoulder   Surgeon: Nick Sanders MD   Assistant(s): MEG Archibald who provided retraction and positioning assistance and was crucial for all parts of the case.   Anesthesia: General endotrachial anesthesia with scalene block.   Estimated blood loss: Less than 100 ml   Total IV fluids: (See anesthesia record)   Blood transfusion: No transfusion was given during surgery   Total urine output: (See anesthesia record)   Drains: None   Specimens: None   Implants: BIOMET COMPREHENSIVE REVERSE TOTAL SHOULDER SYSTEM SIZE 10 MICRO PRESS FIT HUMERAL STEM, STANDARD HUMERAL TRAY WITH STANDARD OFFSET, +3, 40/36 HUMERAL INSERT AND 36 MM GLENOSPHERE WITH MINI NO AUGMENTED GLENOID BASE PLATE      Findings: ARTHROPATHY SHOULDER   Complications: None   Condition: Stable       Activity: Activity as tolerated  Patient may move about with assist as indicated or with supervision     Indications:    Pre-Operative Antibiotics:    Post-Operative DVT Prophylaxis Arthropathy of the shoulder refractory to conservative treatment  Ancef 2 gram IV 30 minutes prior to incision along with 1.95 grams TRANSEXAMIC ACID    MG Q DAY     The patient was taken to the pre operative area, where a scalene block was placed in the  right shoulder.      The patient was then taken to the operating room where general anesthetic was obtained. A Tinajero catheter was placed. They were placed in the beach chair position. The right arm was confirmed as the operative arm. Pre operative range of motion was external rotation 45, abduction  90 and elevation to 130 degrees. The arm was prepped and draped in the usual fashion. Timeout was performed, confirming the right shoulder as the operative shoulder. Approximately, a 4 inch  incision was made over the anterior aspect of the shoulder extending from the coracoid distal and laterally. Full-thickness skin flaps were created. The deltopectoral interval was identified and the cephalic vein was retracted medially with the pectoralis and the deltoid was retracted laterally. The subdeltoid space was entered and freed up. The conjoined tendon was identified and released off the subscapularis. The axillary nerve was palpated, identified and protected throughout the rest of the case. The biceps tendon was identified and tenodesed to the superior aspect of the pectoralis tendon. The subscapularis was then released from the humerus. The remaining rotator cuff was absent.  The subscapularis was tagged and retracted. The inferior aspect of the capsule was then released off the neck of the humerus, giving me excellent exposure. The osteophytes were removed.  The canal was reamed up to a size 10.  The humeral head was osteotomized in 30 degrees of retroversion using the cutting guide and the canal was broached to accept a size 10 humeral stem. With the trial in place, a metal cap was placed to protect the humerus throughout the rest of the glenoid exposure.   I then retracted the humeral head posteriorly. I circumferentially freed up the subscapularis. While retracting the axillary nerve inferiorly, I released the inferior capsule from its attachment on the inferior and posterior glenoid. I was able to obtain good visualization of the glenoid, which was completely devoid of any remaining articular cartilage. I then prepared it to accept the 25 MM NO augmented glenoid base plate securing it with 5 screws.  I then placed the real 36 mm glenosphere with 1.5 mm of inferior off set.  I then trialed the humerus with the STANDARD offset base plate and +3 humeral insert.  With the trial components in place, the shoulder was very stable and had excellent range of motion. I confirmed the stem size and glenoid  position with fluoroscopy. I then removed the trial components, prepared my real implants on the back table and then impacted the press-fit humeral stem with excellent fit and fill proximally and with good rotational stability. I placed the NO augmented humeral tray and +3 humeral insert.  I then reduced the shoulder.  I confirmed again the seating, sizing and orientation of the humeral implant with fluoroscopy. I was very satisfied. I then carefully repaired the subscapularis to the humerus WITH 1 STRATAFIX. At the completion of the subscapularis repair, external rotation was 50, abduction 90, elevation 130 degrees. The wound was irrigated with copious normal saline. Two grams of Vancomycin powder was placed in the sub deltoid and joint space.  The deltopectoral interval was then closed with interrupted 0 Vicryl and running 0-Versylok plus suture. The skin was closed in layers with 0 vicryl in the deep tissues, followed by 2-0 Vicryl, 3-0 Vicryl and Steri-Strips.  The incision was covered with an Aquacel dressing.  The arm was placed in a sling. They were then awoken and transferred to the recovery room in stable condition.   There were no noted complications. Sponge and needle counts were correct.

## 2021-01-20 ENCOUNTER — APPOINTMENT (OUTPATIENT)
Dept: PHYSICAL THERAPY | Facility: CLINIC | Age: 86
End: 2021-01-20
Attending: ORTHOPAEDIC SURGERY
Payer: MEDICARE

## 2021-01-20 ENCOUNTER — APPOINTMENT (OUTPATIENT)
Dept: OCCUPATIONAL THERAPY | Facility: CLINIC | Age: 86
End: 2021-01-20
Attending: ORTHOPAEDIC SURGERY
Payer: MEDICARE

## 2021-01-20 VITALS
HEART RATE: 64 BPM | RESPIRATION RATE: 16 BRPM | SYSTOLIC BLOOD PRESSURE: 124 MMHG | OXYGEN SATURATION: 98 % | DIASTOLIC BLOOD PRESSURE: 47 MMHG | TEMPERATURE: 97.7 F | WEIGHT: 132 LBS | BODY MASS INDEX: 23.39 KG/M2 | HEIGHT: 63 IN

## 2021-01-20 LAB
GLUCOSE SERPL-MCNC: 121 MG/DL (ref 70–99)
HGB BLD-MCNC: 11.4 G/DL (ref 11.7–15.7)

## 2021-01-20 PROCEDURE — 97161 PT EVAL LOW COMPLEX 20 MIN: CPT | Mod: GP | Performed by: PHYSICAL THERAPIST

## 2021-01-20 PROCEDURE — 97116 GAIT TRAINING THERAPY: CPT | Mod: GP | Performed by: PHYSICAL THERAPIST

## 2021-01-20 PROCEDURE — 36415 COLL VENOUS BLD VENIPUNCTURE: CPT | Performed by: ORTHOPAEDIC SURGERY

## 2021-01-20 PROCEDURE — 250N000013 HC RX MED GY IP 250 OP 250 PS 637: Mod: GY | Performed by: ORTHOPAEDIC SURGERY

## 2021-01-20 PROCEDURE — 97110 THERAPEUTIC EXERCISES: CPT | Mod: GO | Performed by: REHABILITATION PRACTITIONER

## 2021-01-20 PROCEDURE — 82947 ASSAY GLUCOSE BLOOD QUANT: CPT | Performed by: ORTHOPAEDIC SURGERY

## 2021-01-20 PROCEDURE — 250N000011 HC RX IP 250 OP 636: Performed by: ORTHOPAEDIC SURGERY

## 2021-01-20 PROCEDURE — 97535 SELF CARE MNGMENT TRAINING: CPT | Mod: GO,59 | Performed by: REHABILITATION PRACTITIONER

## 2021-01-20 PROCEDURE — 97165 OT EVAL LOW COMPLEX 30 MIN: CPT | Mod: GO | Performed by: REHABILITATION PRACTITIONER

## 2021-01-20 PROCEDURE — 97530 THERAPEUTIC ACTIVITIES: CPT | Mod: GP | Performed by: PHYSICAL THERAPIST

## 2021-01-20 PROCEDURE — 85018 HEMOGLOBIN: CPT | Performed by: ORTHOPAEDIC SURGERY

## 2021-01-20 RX ADMIN — HYDROXYZINE HYDROCHLORIDE 10 MG: 10 TABLET, FILM COATED ORAL at 04:41

## 2021-01-20 RX ADMIN — HYDROCHLOROTHIAZIDE: 12.5 CAPSULE ORAL at 08:00

## 2021-01-20 RX ADMIN — POLYETHYLENE GLYCOL 3350 17 G: 17 POWDER, FOR SOLUTION ORAL at 08:01

## 2021-01-20 RX ADMIN — ALBUTEROL SULFATE 1 PUFF: 90 INHALANT RESPIRATORY (INHALATION) at 00:14

## 2021-01-20 RX ADMIN — DOCUSATE SODIUM 100 MG: 100 CAPSULE, LIQUID FILLED ORAL at 08:00

## 2021-01-20 RX ADMIN — DOCUSATE SODIUM 50 MG AND SENNOSIDES 8.6 MG 1 TABLET: 8.6; 5 TABLET, FILM COATED ORAL at 08:00

## 2021-01-20 RX ADMIN — ACETAMINOPHEN 975 MG: 325 TABLET, FILM COATED ORAL at 08:00

## 2021-01-20 RX ADMIN — CEFAZOLIN 1 G: 1 INJECTION, POWDER, FOR SOLUTION INTRAMUSCULAR; INTRAVENOUS at 00:05

## 2021-01-20 RX ADMIN — HYDROXYZINE HYDROCHLORIDE 10 MG: 10 TABLET, FILM COATED ORAL at 11:04

## 2021-01-20 RX ADMIN — ACETAMINOPHEN 975 MG: 325 TABLET, FILM COATED ORAL at 00:05

## 2021-01-20 RX ADMIN — CLOPIDOGREL BISULFATE 75 MG: 75 TABLET ORAL at 08:01

## 2021-01-20 ASSESSMENT — ACTIVITIES OF DAILY LIVING (ADL): IADL_COMMENTS: SPOUSE TO COMPLETE AS NEEDED

## 2021-01-20 NOTE — PLAN OF CARE
Occupational Therapy Discharge Summary    Reason for therapy discharge:    Discharged to home.    Progress towards therapy goal(s). See goals on Care Plan in [facility] electronic health record for goal details.  Goals not met.  Barriers to achieving goals:   limited tolerance for therapy and discharge from facility.    Therapy recommendation(s):    No further therapy is recommended.  Continue home exercise program.

## 2021-01-20 NOTE — PLAN OF CARE
Patient vital signs are at baseline: Yes  Patient able to ambulate as they were prior to admission or with assist devices provided by therapies during their stay:  Yes  Patient MUST void prior to discharge:  Yes  Patient able to tolerate oral intake:  Yes  Pain has adequate pain control using Oral analgesics:  Yes      Pt up with 1 assist and gait belt. LS diminished with infrequent cough. BS present, passing flatus. CMS - R hand and arm were numb earlier which seems to be resolving. Sling is on and ice applied to shoulder. Alexander CDI. Voiding adequately. Tylenol and Atarax given for pain control. Plan is to discharge to home today.

## 2021-01-20 NOTE — PLAN OF CARE
Pt A&O, VSS, on capno and 2L O2. EtCO2 30-35, IPI8-10, O2 %. Pt denies pain to right arm, extremity still very numb and unable to move. LS clear, dim. Pt up several times to void in BS commode. Up with assist of 2 with gait belt and walker.

## 2021-01-20 NOTE — PLAN OF CARE
Patient vital signs are at baseline: Yes  Patient able to ambulate as they were prior to admission or with assist devices provided by therapies during their stay:  Yes  Patient MUST void prior to discharge:  Yes  Patient able to tolerate oral intake:  Yes  Pain has adequate pain control using Oral analgesics:  Yes    Pt plans to discharge to home with  after afternoon therapies

## 2021-01-20 NOTE — PROGRESS NOTES
01/20/21 1330   Quick Adds   Type of Visit Initial PT Evaluation   Living Environment   Living Environment Comments Pt lives in [facility], [address] TRISH with JENNIFER bran. Lives with . Use of cane outside home. Reports  able assist with all mobility 24/7.    Self-Care   Usual Activity Tolerance moderate   Current Activity Tolerance fair   Equipment Currently Used at Home cane, straight;raised toilet seat;grab bar, tub/shower;grab bar, toilet;shower chair   Disability/Function   Fall history within last six months no   General Information   Onset of Illness/Injury or Date of Surgery 01/01/21   Referring Physician Nick Sanders MD   Patient/Family Therapy Goals Statement (PT) To return home   Pertinent History of Current Problem (include personal factors and/or comorbidities that impact the POC) Pt is an 85 year old female POD1 s/p R reverse TSA.    Existing Precautions/Restrictions shoulder;weight bearing   Weight-Bearing Status - RUE nonweight-bearing   Cognition   Orientation Status (Cognition) oriented x 4   Affect/Mental Status (Cognition) WFL   Follows Commands (Cognition) WFL   Pain Assessment   Patient Currently in Pain Yes, see Vital Sign flowsheet   Range of Motion (ROM)   ROM Comment B LE WFL, decreased R UE    Strength   Strength Comments B LE functionally demonstrated >3/5 strength   Bed Mobility   Comment (Bed Mobility) not assessed   Transfers   Transfer Safety Comments Magdiel sit to stand with SEC   Gait/Stairs (Locomotion)   Comment (Gait/Stairs) Magdiel with SEC   Balance   Balance Comments fair+ static sitting; poor standing balance with unilateral UE support   Clinical Impression   Criteria for Skilled Therapeutic Intervention yes, treatment indicated   PT Diagnosis (PT) impaired functional mobility   Influenced by the following impairments impaired balance; pain   Functional limitations due to impairments impaired transfers, ambulation   Clinical Presentation Stable/Uncomplicated   Clinical  Presentation Rationale Pt is medically stable   Clinical Decision Making (Complexity) low complexity   Therapy Frequency (PT) One time eval and treatment only   Predicted Duration of Therapy Intervention (days/wks) 1 day   Planned Therapy Interventions (PT) balance training;gait training;patient/family education;stair training;strengthening;transfer training   Anticipated Equipment Needs at Discharge (PT) walker, isacc   Risk & Benefits of therapy have been explained evaluation/treatment results reviewed;care plan/treatment goals reviewed;risks/benefits reviewed;current/potential barriers reviewed;participants voiced agreement with care plan;participants included;patient   PT Discharge Planning    PT Discharge Recommendation (DC Rec)   (Defer to ortho)   PT Rationale for DC Rec Patient with very unsteady gait and requires 24/7 assistance for mobility and use of hemiwalker; pt Magdiel for transfers and CGA/Magdiel for ambulation   Total Evaluation Time   Total Evaluation Time (Minutes) 5

## 2021-01-20 NOTE — PROGRESS NOTES
[facility]      OUTPATIENT OCCUPATIONAL THERAPY  EVALUATION  PLAN OF TREATMENT FOR OUTPATIENT REHABILITATION  (COMPLETE FOR INITIAL CLAIMS ONLY)  Patient's Last Name, First Name, M.I.  YOB: 1935  Azra Christine                          Provider's Name  [facility] Medical Record No.  [id number]                               Onset Date:  01/19/21   Start of Care Date:  01/20/21     Type:     ___PT   _X_OT   ___SLP Medical Diagnosis:  R reverse TSA                        OT Diagnosis:  decreased ADLS   Visits from SOC:  1   _________________________________________________________________________________  Plan of Treatment/Functional Goals    Planned Interventions: ADL retraining, transfer training, progressive activity/exercise, home program guidelines, ROM   Goals: See Occupational Therapy Goals on Care Plan in [employer] electronic health record.    Therapy Frequency: 2x/day  Predicted Duration of Therapy Intervention: 1 day  _________________________________________________________________________________    I CERTIFY THE NEED FOR THESE SERVICES FURNISHED UNDER        THIS PLAN OF TREATMENT AND WHILE UNDER MY CARE     (Physician co-signature of this document indicates review and certification of the therapy plan).                Certification date from: 01/20/21, Certification date to: 01/20/21    Referring Physician: Dr. Sanders            Initial Assessment        See Occupational Therapy evaluation dated 01/20/21 in Epic electronic health record.

## 2021-01-20 NOTE — PROGRESS NOTES
"[facility] RCAT  Date: January 19, 2021  Admission Dx: arthritis of shoulder  Pulmonary History: Asthma, COPD  Home Nebulizer/MDI Use: Combivent prn (med rec reports QID, pt reports prn), Albuterol prn  Home Oxygen: None  Acuity Level (RCAT flow sheet): Level 4    Aerosol Therapy initiated: Combivent prn, albuterol MDI prn    Pulmonary Hygiene initiated: Coughing and deep breathing techniques    Volume Expansion initiated: Incentive spirometer    Current Oxygen Requirements: 2L NC  Current SpO2: 96%    Re-evaluation date: 1/22/2021    Patient Education: Education was provided on RCAT process. Will continue to do education with patient.    See \"RT Assessments\" flow sheet for patient assessment scoring and Acuity Level Details.     Timmy Khalil, RT on 1/19/2021 at 8:04 PM      "

## 2021-01-20 NOTE — PROGRESS NOTES
"   01/20/21 0800   Quick Adds   Type of Visit Initial Occupational Therapy Evaluation   Living Environment   People in home spouse   Current Living Arrangements house  ([facility])   Home Accessibility stairs to enter home   Number of Stairs, Main Entrance 2   Stair Railings, Main Entrance railings on both sides of stairs   Transportation Anticipated family or friend will provide   Living Environment Comments walk in shower, with stool, grab bar outside of shower. Has elevated toilet, also owns RTS with armrests   Self-Care   Usual Activity Tolerance good   Current Activity Tolerance moderate   Equipment Currently Used at Home none   Activity/Exercise/Self-Care Comment patient uses 3 prong cane in community only or holds on to spouse. Tends to furniture walk in home, no falls, reports she does \"stumbled\" at times   Disability/Function   Hearing Difficulty or Deaf no   Wear Glasses or Blind yes   Vision Management glasses   Concentrating, Remembering or Making Decisions Difficulty no   Difficulty Communicating no   Difficulty Eating/Swallowing no   Walking or Climbing Stairs Difficulty yes   Walking or Climbing Stairs ambulation difficulty, assistance 1 person;ambulation difficulty, requires equipment  (in community used rponmg cane, furniture walks in home)   Dressing/Bathing Difficulty yes   Dressing/Bathing dressing difficulty, assistance 1 person   Toileting issues no   Doing Errands Independently Difficulty (such as shopping) yes  (with spouse)   Fall history within last six months no   Change in Functional Status Since Onset of Current Illness/Injury yes   General Information   Onset of Illness/Injury or Date of Surgery 01/19/21   Referring Physician Dr. Sanders   Patient/Family Therapy Goal Statement (OT) to return home today   Additional Occupational Profile Info/Pertinent History of Current Problem Patient is pOD #1 for reverse R TSA   Existing Precautions/Restrictions shoulder   Right Upper Extremity " (Weight-bearing Status) non weight-bearing (NWB)   General Observations and Info Per orders-  ACTIVE RANGE OF MOTION THE ELBOW AND WRIST NO RESTRICTIONS.   ONLY PASSIVE  PENDULUM TO THE SHOULDER WITH FLEXION TO 90.  NO EXTERNAL ROTATION PAST NEUTRAL.   Cognitive Status Examination   Orientation Status orientation to person, place and time   Visual Perception   Visual Impairment/Limitations corrective lenses full-time   Sensory   Sensory Comments decreased sensation from nerve block   Pain Assessment   Patient Currently in Pain No   Integumentary/Edema   Integumentary/Edema Comments mild edema in R hand   Posture   Posture not impaired   Range of Motion Comprehensive   General Range of Motion no range of motion deficits identified  (in L UE)   Muscle Tone Assessment   Muscle Tone Quick Adds No deficits were identified  (in B Ue)   Muscle Tone Comments patient reports she has head tremor at baseline, was assessed for Parkinson's, reports she was told she does not have it   Coordination   Upper Extremity Coordination No deficits were identified   Bed Mobility   Comment (Bed Mobility) not assesed, patient was seated in chair at time of OT session   Transfers   Transfer Comments defer to OT daily note   Balance   Balance Comments decreased balance at baseline. relying on OT for support for min ambulation in room. PT is recommended, patient was updated on plan to have PT assessment   Activities of Daily Living   BADL Assessment/Intervention upper body dressing   Upper Body Dressing Assessment/Training   Comment (Upper Body Dressing) defer to OT daily note   Instrumental Activities of Daily Living (IADL)   IADL Comments spouse to complete as needed   Clinical Impression   Criteria for Skilled Therapeutic Interventions Met (OT) yes;meets criteria;skilled treatment is necessary   OT Diagnosis decreased ADLS   OT Problem List-Impairments impacting ADL balance;strength;post-surgical precautions;range of motion (ROM)    Assessment of Occupational Performance 5 or more Performance Deficits   Identified Performance Deficits decreased ADLs/IADLS- dsg, toileting, bathing, functional/community mobility, household chores, driving, errands   Planned Therapy Interventions (OT) ADL retraining;transfer training;progressive activity/exercise;home program guidelines;ROM   Clinical Decision Making Complexity (OT) low complexity   Therapy Frequency (OT) 2x/day   Predicted Duration of Therapy 1 day   Risks and Benefits of Treatment have been explained. Yes   Patient, Family & other staff in agreement with plan of care Yes   OT Discharge Planning    OT Rationale for DC Rec defer to ortho- anticipate patient will meet needed goals for safe return home with spouse A for all IADLs- household chores, errands, driving as well as ADLs- dsg, toileting, bathing, safe mobility   Therapy Certification   Start of Care Date 01/20/21   Certification date from 01/20/21   Certification date to 01/20/21   Medical Diagnosis R reverse TSA   Total Evaluation Time (Minutes)   Total Evaluation Time (Minutes) 10

## 2021-01-28 NOTE — PLAN OF CARE
[facility]      OUTPATIENT PHYSICAL THERAPY EVALUATION  PLAN OF TREATMENT FOR OUTPATIENT REHABILITATION  (COMPLETE FOR INITIAL CLAIMS ONLY)  Patient's Last Name, First Name, M.I.  YOB: 1935  Azra Christine                        Provider's Name  [facility] Medical Record No.  [id number]                               Onset Date:  01/01/21   Start of Care Date:    1/20/2021     Type:     _X_PT   ___OT   ___SLP Medical Diagnosis:   R reverse TSA                        PT Diagnosis:  impaired functional mobility   Visits from SOC:  1   _________________________________________________________________________________  Plan of Treatment/Functional Goals    Planned Interventions: balance training, gait training, patient/family education, stair training, strengthening, transfer training     Goals: See Physical Therapy Goals on Care Plan in [facility] electronic health record.    Therapy Frequency: One time eval and treatment only  Predicted Duration of Therapy Intervention: 1 day  _________________________________________________________________________________    I CERTIFY THE NEED FOR THESE SERVICES FURNISHED UNDER        THIS PLAN OF TREATMENT AND WHILE UNDER MY CARE     (Physician co-signature of this document indicates review and certification of the therapy plan).               Referring Physician: Nick Sanders MD            Initial Assessment        See Physical Therapy evaluation dated   in Epic electronic health record.

## 2021-01-28 NOTE — PLAN OF CARE
Physical Therapy Discharge Summary    Reason for therapy discharge:    Discharged to home.    Progress towards therapy goal(s). See goals on Care Plan in [facility] electronic health record for goal details.  Goals met    Therapy recommendation(s):    Defer to ortho

## 2021-07-04 ENCOUNTER — HEALTH MAINTENANCE LETTER (OUTPATIENT)
Age: 86
End: 2021-07-04

## 2021-10-24 ENCOUNTER — HEALTH MAINTENANCE LETTER (OUTPATIENT)
Age: 86
End: 2021-10-24

## 2022-04-24 ENCOUNTER — HOSPITAL ENCOUNTER (EMERGENCY)
Facility: CLINIC | Age: 87
Discharge: HOME | End: 2022-04-24
Attending: EMERGENCY MEDICINE | Admitting: EMERGENCY MEDICINE
Payer: MEDICARE

## 2022-04-24 ENCOUNTER — APPOINTMENT (OUTPATIENT)
Dept: GENERAL RADIOLOGY | Facility: CLINIC | Age: 87
End: 2022-04-24
Attending: EMERGENCY MEDICINE
Payer: MEDICARE

## 2022-04-24 VITALS
DIASTOLIC BLOOD PRESSURE: 112 MMHG | HEART RATE: 75 BPM | TEMPERATURE: 98.3 F | RESPIRATION RATE: 25 BRPM | OXYGEN SATURATION: 95 % | SYSTOLIC BLOOD PRESSURE: 133 MMHG

## 2022-04-24 DIAGNOSIS — J44.1 COPD EXACERBATION (H): ICD-10-CM

## 2022-04-24 LAB
ANION GAP SERPL CALCULATED.3IONS-SCNC: 5 MMOL/L (ref 3–14)
BASOPHILS # BLD AUTO: 0.1 10E3/UL (ref 0–0.2)
BASOPHILS NFR BLD AUTO: 1 %
BUN SERPL-MCNC: 16 MG/DL (ref 7–30)
CALCIUM SERPL-MCNC: 9.8 MG/DL (ref 8.5–10.1)
CHLORIDE BLD-SCNC: 101 MMOL/L (ref 94–109)
CO2 SERPL-SCNC: 27 MMOL/L (ref 20–32)
CREAT SERPL-MCNC: 0.53 MG/DL (ref 0.52–1.04)
EOSINOPHIL # BLD AUTO: 0.1 10E3/UL (ref 0–0.7)
EOSINOPHIL NFR BLD AUTO: 2 %
ERYTHROCYTE [DISTWIDTH] IN BLOOD BY AUTOMATED COUNT: 12.8 % (ref 10–15)
GFR SERPL CREATININE-BSD FRML MDRD: >90 ML/MIN/1.73M2
GLUCOSE BLD-MCNC: 115 MG/DL (ref 70–99)
HCT VFR BLD AUTO: 44.1 % (ref 35–47)
HGB BLD-MCNC: 14.4 G/DL (ref 11.7–15.7)
IMM GRANULOCYTES # BLD: 0 10E3/UL
IMM GRANULOCYTES NFR BLD: 0 %
LYMPHOCYTES # BLD AUTO: 2.2 10E3/UL (ref 0.8–5.3)
LYMPHOCYTES NFR BLD AUTO: 23 %
MCH RBC QN AUTO: 31.1 PG (ref 26.5–33)
MCHC RBC AUTO-ENTMCNC: 32.7 G/DL (ref 31.5–36.5)
MCV RBC AUTO: 95 FL (ref 78–100)
MONOCYTES # BLD AUTO: 1.1 10E3/UL (ref 0–1.3)
MONOCYTES NFR BLD AUTO: 11 %
NEUTROPHILS # BLD AUTO: 5.9 10E3/UL (ref 1.6–8.3)
NEUTROPHILS NFR BLD AUTO: 63 %
NRBC # BLD AUTO: 0 10E3/UL
NRBC BLD AUTO-RTO: 0 /100
PLATELET # BLD AUTO: 262 10E3/UL (ref 150–450)
POTASSIUM BLD-SCNC: 3.6 MMOL/L (ref 3.4–5.3)
RBC # BLD AUTO: 4.63 10E6/UL (ref 3.8–5.2)
SODIUM SERPL-SCNC: 133 MMOL/L (ref 133–144)
WBC # BLD AUTO: 9.4 10E3/UL (ref 4–11)

## 2022-04-24 PROCEDURE — 94640 AIRWAY INHALATION TREATMENT: CPT

## 2022-04-24 PROCEDURE — 250N000011 HC RX IP 250 OP 636: Performed by: EMERGENCY MEDICINE

## 2022-04-24 PROCEDURE — 250N000009 HC RX 250: Performed by: EMERGENCY MEDICINE

## 2022-04-24 PROCEDURE — 71046 X-RAY EXAM CHEST 2 VIEWS: CPT

## 2022-04-24 PROCEDURE — 36415 COLL VENOUS BLD VENIPUNCTURE: CPT | Performed by: EMERGENCY MEDICINE

## 2022-04-24 PROCEDURE — 999N000105 HC STATISTIC NO DOCUMENTATION TO SUPPORT CHARGE

## 2022-04-24 PROCEDURE — 999N000157 HC STATISTIC RCP TIME EA 10 MIN

## 2022-04-24 PROCEDURE — 80048 BASIC METABOLIC PNL TOTAL CA: CPT | Performed by: EMERGENCY MEDICINE

## 2022-04-24 PROCEDURE — 93005 ELECTROCARDIOGRAM TRACING: CPT

## 2022-04-24 PROCEDURE — 85004 AUTOMATED DIFF WBC COUNT: CPT | Performed by: EMERGENCY MEDICINE

## 2022-04-24 PROCEDURE — 99285 EMERGENCY DEPT VISIT HI MDM: CPT | Mod: 25

## 2022-04-24 PROCEDURE — 96374 THER/PROPH/DIAG INJ IV PUSH: CPT

## 2022-04-24 RX ORDER — ALBUTEROL SULFATE 0.83 MG/ML
2.5 SOLUTION RESPIRATORY (INHALATION) EVERY 6 HOURS PRN
Qty: 75 ML | Refills: 0 | Status: SHIPPED | OUTPATIENT
Start: 2022-04-24

## 2022-04-24 RX ORDER — METHYLPREDNISOLONE SODIUM SUCCINATE 125 MG/2ML
125 INJECTION, POWDER, LYOPHILIZED, FOR SOLUTION INTRAMUSCULAR; INTRAVENOUS ONCE
Status: COMPLETED | OUTPATIENT
Start: 2022-04-24 | End: 2022-04-24

## 2022-04-24 RX ORDER — ALBUTEROL SULFATE 0.83 MG/ML
2.5 SOLUTION RESPIRATORY (INHALATION) EVERY 6 HOURS PRN
Qty: 75 ML | Refills: 0 | Status: SHIPPED | OUTPATIENT
Start: 2022-04-24 | End: 2022-04-24

## 2022-04-24 RX ORDER — IPRATROPIUM BROMIDE AND ALBUTEROL SULFATE 2.5; .5 MG/3ML; MG/3ML
3 SOLUTION RESPIRATORY (INHALATION)
Status: COMPLETED | OUTPATIENT
Start: 2022-04-24 | End: 2022-04-24

## 2022-04-24 RX ADMIN — IPRATROPIUM BROMIDE AND ALBUTEROL SULFATE 3 ML: 2.5; .5 SOLUTION RESPIRATORY (INHALATION) at 05:22

## 2022-04-24 RX ADMIN — METHYLPREDNISOLONE SODIUM SUCCINATE 125 MG: 125 INJECTION, POWDER, FOR SOLUTION INTRAMUSCULAR; INTRAVENOUS at 04:59

## 2022-04-24 RX ADMIN — IPRATROPIUM BROMIDE AND ALBUTEROL SULFATE 3 ML: 2.5; .5 SOLUTION RESPIRATORY (INHALATION) at 05:30

## 2022-04-24 RX ADMIN — IPRATROPIUM BROMIDE AND ALBUTEROL SULFATE 3 ML: 2.5; .5 SOLUTION RESPIRATORY (INHALATION) at 05:08

## 2022-04-24 ASSESSMENT — ENCOUNTER SYMPTOMS
COUGH: 1
FEVER: 0
VOMITING: 1
SHORTNESS OF BREATH: 1
ABDOMINAL PAIN: 0

## 2022-04-24 NOTE — PROGRESS NOTES
Respiratory Therapy Note        Pt was given instruction in the use and care of a home nebulizer.  Discussed with her and her  the effects of her inhaled medications.    April 24, 2022 7:16 AM  Dwayne Cheney RT

## 2022-04-24 NOTE — ED NOTES
Hx of copd and now coughing up yellow phlegm and pt is able to talk . Feels like she cant cough it up

## 2022-04-24 NOTE — ED PROVIDER NOTES
History   Chief Complaint:  Shortness of Breath       The history is provided by the patient.      Azra Christine is a 86 year old female on Plavix with history of COPD, asthma, hyperlipidemia and hypertension who presents with shortness of breath. Symptoms have been ongoing for 3 days with a productive cough. She denies fever, abdominal pain, or leg swelling. She mentions previous episodes, but this episode is more severe. Her most recent flare up was on March 9th. Typically, doxycyline and prednisone improve her symptoms.  She reports that she has a standing prescription from her primary care physician for these medications.  She started taking them yesterday.  Her last dose of prednisone was yesterday, but she vomited afterwards. Additionally, she uses inhalers at home. She denies sick contacts at home.  She denies any chest pain.  She denies any other symptoms at this time.    Review of Systems   Constitutional: Negative for fever.   Respiratory: Positive for cough and shortness of breath.    Cardiovascular: Negative for leg swelling.   Gastrointestinal: Positive for vomiting. Negative for abdominal pain.   All other systems reviewed and are negative.      Allergies:  No Known Allergies    Medications:  albuterol  alendronate   plavix   fluticasone   gabapentin   hydroxyzine   ipratropium-albuterol   losartan-hydrochlorothiazide   oxybutynin   simvastatin   tramadol   celecoxib  doxycyline     Past Medical History:     Asthma   Bronchitis   Colitis   COPD (chronic obstructive pulmonary disease)   Heart attack    Hyperlipidemia   Hypertension   Lumbar facet arthropathy   Noninfectious ileitis   Osteoarthritis   Stroke  Pneumonia   Arthritis of shoulder region, right   Coronary artery disease   CVA (cerebral infarction)  Hyponatremia  UTI (urinary tract infection)   Sepsis   Multifactorial gait disorder  Osteopenia of multiple sites  Chronic ischemic heart disease    Past Surgical History:    Appendectomy    Arthoplasty hip anterior, right   3 vessel CABG  Tonsillectomy   Blepharoplasty   Bladder repair   Hysterectomy   L TALISHA  Reverse arthoplasty shoulder, right     Family History:    Father: heart disease   Mother: heart disease   Brother: thyroid cancer   Sister: leukemia   Son: coronary artery disease, obesity     Social History:  Presents with a relative.   PCP: Yas Sebastian      Physical Exam     Patient Vitals for the past 24 hrs:   BP Temp Temp src Pulse Resp SpO2   04/24/22 0615 (!) 177/84 -- -- 74 24 95 %   04/24/22 0600 (!) 187/89 -- -- 75 22 95 %   04/24/22 0555 (!) 136/106 -- -- 84 21 96 %   04/24/22 0445 (!) 235/135 -- -- 86 26 99 %   04/24/22 0428 (!) 201/106 98.3  F (36.8  C) Oral 100 20 95 %       Physical Exam  General: Nontoxic. Appears in mild distress due to dyspnea.  Head:  Scalp, face, and head appear normal  Eyes:  Pupils are equal, round    Conjunctivae non-injected and sclerae white  ENT:    The external nose is normal    Pinnae are normal  Neck:  Normal range of motion    There is no rigidity noted    Trachea is in the midline  CV:  Regular rate and rhythm     Normal S1/S2, no S3/S4    No murmur or rub. Radial pulses 2+ bilaterally.  Resp:  Lungs are equal bilaterally, breath sounds decreased bilaterally.  Prolonged expiratory phase.  Positive tachypnea with moderate increased work of breathing and accessory muscle use.    No rales, wheezing, or rhonchi  GI:  Abdomen is soft, no rigidity or guarding    No distension, or mass    No tenderness or rebound tenderness   MS:  Normal muscular tone    Symmetric motor strength    No lower extremity edema. No calf swelling or tenderness.  Skin:  No rash or acute skin lesions noted  Neuro: Awake and alert  Speech is normal and fluent  Moves all extremities spontaneously  Psych:  Normal affect. Appropriate interactions.      Emergency Department Course   ECG:  ECG taken at 0440, ECG read at 0457  Sinus rhythm with arrhythmia with 1st degree AV block    Left anterior fascicular block   Septal infarct, age undetermined   Abnormal ECG   Rate 97 bpm. IN interval 238 ms. QRS duration 106 ms. QT/QTc 356/452 ms. P-R-T axes * -50 76.    Imaging:  XR Chest 2 Views   Final Result   IMPRESSION: Hyperinflation compatible COPD. Minimal scattered areas of linear interstitial fibrotic changes up. Areas of linear scarring both lower lungs. No acute appearing pulmonary infiltrates or pleural fluid. No pneumothorax. Sternotomy. Mediastinal    clips. Right shoulder arthroplasty. Degenerative changes in the spine.        Report per radiology    Laboratory:  Labs Ordered and Resulted from Time of ED Arrival to Time of ED Departure   BASIC METABOLIC PANEL - Abnormal       Result Value    Sodium 133      Potassium 3.6      Chloride 101      Carbon Dioxide (CO2) 27      Anion Gap 5      Urea Nitrogen 16      Creatinine 0.53      Calcium 9.8      Glucose 115 (*)     GFR Estimate >90     CBC WITH PLATELETS AND DIFFERENTIAL    WBC Count 9.4      RBC Count 4.63      Hemoglobin 14.4      Hematocrit 44.1      MCV 95      MCH 31.1      MCHC 32.7      RDW 12.8      Platelet Count 262      % Neutrophils 63      % Lymphocytes 23      % Monocytes 11      % Eosinophils 2      % Basophils 1      % Immature Granulocytes 0      NRBCs per 100 WBC 0      Absolute Neutrophils 5.9      Absolute Lymphocytes 2.2      Absolute Monocytes 1.1      Absolute Eosinophils 0.1      Absolute Basophils 0.1      Absolute Immature Granulocytes 0.0      Absolute NRBCs 0.0        Emergency Department Course:         Reviewed:  I reviewed nursing notes, vitals, past medical history and Care Everywhere    Assessments/Consults:  ED Course as of 04/24/22 0629   Sun Apr 24, 2022   0448 I obtained history and examined the patient as noted above.   0448 Patient is feeling better and will be going home.      Interventions:  0459 Solu-medrol 125mg IV   0508 Duoneb 3mL Nebulization   0522 Duoneb 3mL Nebulization   0530 Duoneb 3mL  Nebulization     Disposition:  The patient was discharged to home.     Impression & Plan       Medical Decision Making:  Azra Christine is a 86 year old female with a history of COPD who presents for evaluation of shortness of breath. Signs and symptoms are consistent with COPD exacerbation. A broad differential was considered including foreign body, COPD, viral induced reactive airway disease, pneumothorax, cardiac equivalent, allergic phenomena, pneumonia, bronchitis, etc. Chest x-ray without any acute processes demonstrated.  No evidence for pneumonia, pleural effusion, pulmonary edema, pneumothorax or other acute thoracic findings. Patient feels improved after the above interventions here in the ED. There are no signs at this point of any other serious etiologies including those mentioned above, especially acute coronary syndrome.  EKG with sinus rhythm and first-degree AV block without evidence of acute ischemia or dysrhythmia.  Patient is not having any chest pain.  No evidence for ACS or PE.  No indication for hospitalization at this time including no hypoxia, no marked increase in respiratory rate, minimal to no retractions. Supportive outpatient management is therefore indicated.  Patient will continue the prednisone 40 mg daily and doxycycline 100 mg twice daily that she is prescribed by her primary care physician. I advised close followup with primary care physician and return if increased wheezing, progressive shortness of breath, develops fever, or other worsening.  Patient discharged with nebulizer compressor and nebulizer supplies for home.  Patient is agreeable to plan of care.  Close return precautions were provided and she was discharged in stable and improved condition.      Diagnosis:    ICD-10-CM    1. COPD exacerbation (H)  J44.1        Discharge Medications:  New Prescriptions    ALBUTEROL (PROVENTIL) (2.5 MG/3ML) 0.083% NEB SOLUTION    Take 1 vial (2.5 mg) by nebulization every 6 hours as  needed for shortness of breath / dyspnea or wheezing       Scribe Disclosure:  I, Cristobal Ashton, am serving as a scribe at 4:46 AM on 4/24/2022 to document services personally performed by Dwayne Brice MD based on my observations and the provider's statements to me.          Dwayne Brice MD  04/24/22 0644

## 2022-04-24 NOTE — ED TRIAGE NOTES
"Pt states having worsening dyspnea for over 3 days with productive cough. Pt has hx of COPD and asthma. Pt states \"when I cough I get something stuck and I can't breathe\". ABCs intact GCS 15  "

## 2022-04-25 LAB
ATRIAL RATE - MUSE: 97 BPM
DIASTOLIC BLOOD PRESSURE - MUSE: NORMAL MMHG
INTERPRETATION ECG - MUSE: NORMAL
P AXIS - MUSE: NORMAL DEGREES
PR INTERVAL - MUSE: 238 MS
QRS DURATION - MUSE: 106 MS
QT - MUSE: 356 MS
QTC - MUSE: 452 MS
R AXIS - MUSE: -50 DEGREES
SYSTOLIC BLOOD PRESSURE - MUSE: NORMAL MMHG
T AXIS - MUSE: 76 DEGREES
VENTRICULAR RATE- MUSE: 97 BPM

## 2022-07-31 ENCOUNTER — HEALTH MAINTENANCE LETTER (OUTPATIENT)
Age: 87
End: 2022-07-31

## 2022-10-15 ENCOUNTER — HEALTH MAINTENANCE LETTER (OUTPATIENT)
Age: 87
End: 2022-10-15

## 2023-08-26 ENCOUNTER — HEALTH MAINTENANCE LETTER (OUTPATIENT)
Age: 88
End: 2023-08-26

## 2024-08-17 ENCOUNTER — HOSPITAL ENCOUNTER (OUTPATIENT)
Facility: CLINIC | Age: 89
Setting detail: OBSERVATION
Discharge: HOME | End: 2024-08-18
Attending: SOCIAL WORKER | Admitting: INTERNAL MEDICINE
Payer: MEDICARE

## 2024-08-17 ENCOUNTER — APPOINTMENT (OUTPATIENT)
Dept: GENERAL RADIOLOGY | Facility: CLINIC | Age: 89
End: 2024-08-17
Attending: EMERGENCY MEDICINE
Payer: MEDICARE

## 2024-08-17 DIAGNOSIS — R79.89 TROPONIN LEVEL ELEVATED: ICD-10-CM

## 2024-08-17 DIAGNOSIS — J44.1 COPD EXACERBATION (H): ICD-10-CM

## 2024-08-17 LAB
ANION GAP SERPL CALCULATED.3IONS-SCNC: 14 MMOL/L (ref 7–15)
BASOPHILS # BLD AUTO: 0 10E3/UL (ref 0–0.2)
BASOPHILS NFR BLD AUTO: 0 %
BUN SERPL-MCNC: 24.4 MG/DL (ref 8–23)
CALCIUM SERPL-MCNC: 9.3 MG/DL (ref 8.8–10.4)
CHLORIDE SERPL-SCNC: 96 MMOL/L (ref 98–107)
CREAT SERPL-MCNC: 0.67 MG/DL (ref 0.51–0.95)
CREAT SERPL-MCNC: 0.69 MG/DL (ref 0.51–0.95)
D DIMER PPP FEU-MCNC: 0.33 UG/ML FEU (ref 0–0.5)
EGFRCR SERPLBLD CKD-EPI 2021: 84 ML/MIN/1.73M2
EGFRCR SERPLBLD CKD-EPI 2021: 84 ML/MIN/1.73M2
EOSINOPHIL # BLD AUTO: 0 10E3/UL (ref 0–0.7)
EOSINOPHIL NFR BLD AUTO: 0 %
ERYTHROCYTE [DISTWIDTH] IN BLOOD BY AUTOMATED COUNT: 12.6 % (ref 10–15)
FLUAV RNA SPEC QL NAA+PROBE: NEGATIVE
FLUBV RNA RESP QL NAA+PROBE: NEGATIVE
GLUCOSE SERPL-MCNC: 209 MG/DL (ref 70–99)
HBA1C MFR BLD: 6 %
HCO3 SERPL-SCNC: 24 MMOL/L (ref 22–29)
HCT VFR BLD AUTO: 39.8 % (ref 35–47)
HGB BLD-MCNC: 13.1 G/DL (ref 11.7–15.7)
HOLD SPECIMEN: NORMAL
HOLD SPECIMEN: NORMAL
IMM GRANULOCYTES # BLD: 0.1 10E3/UL
IMM GRANULOCYTES NFR BLD: 1 %
LYMPHOCYTES # BLD AUTO: 0.7 10E3/UL (ref 0.8–5.3)
LYMPHOCYTES NFR BLD AUTO: 8 %
MCH RBC QN AUTO: 30.8 PG (ref 26.5–33)
MCHC RBC AUTO-ENTMCNC: 32.9 G/DL (ref 31.5–36.5)
MCV RBC AUTO: 93 FL (ref 78–100)
MONOCYTES # BLD AUTO: 0.5 10E3/UL (ref 0–1.3)
MONOCYTES NFR BLD AUTO: 6 %
NEUTROPHILS # BLD AUTO: 7.5 10E3/UL (ref 1.6–8.3)
NEUTROPHILS NFR BLD AUTO: 85 %
NRBC # BLD AUTO: 0 10E3/UL
NRBC BLD AUTO-RTO: 0 /100
NT-PROBNP SERPL-MCNC: 856 PG/ML (ref 0–1800)
PLATELET # BLD AUTO: 287 10E3/UL (ref 150–450)
POTASSIUM SERPL-SCNC: 4 MMOL/L (ref 3.4–5.3)
PROCALCITONIN SERPL IA-MCNC: 0.04 NG/ML
RBC # BLD AUTO: 4.26 10E6/UL (ref 3.8–5.2)
RSV RNA SPEC NAA+PROBE: NEGATIVE
SARS-COV-2 RNA RESP QL NAA+PROBE: NEGATIVE
SODIUM SERPL-SCNC: 134 MMOL/L (ref 135–145)
TROPONIN T SERPL HS-MCNC: 25 NG/L
TROPONIN T SERPL HS-MCNC: 35 NG/L
WBC # BLD AUTO: 8.8 10E3/UL (ref 4–11)

## 2024-08-17 PROCEDURE — 87637 SARSCOV2&INF A&B&RSV AMP PRB: CPT | Performed by: EMERGENCY MEDICINE

## 2024-08-17 PROCEDURE — 80048 BASIC METABOLIC PNL TOTAL CA: CPT | Performed by: EMERGENCY MEDICINE

## 2024-08-17 PROCEDURE — 250N000011 HC RX IP 250 OP 636: Performed by: INTERNAL MEDICINE

## 2024-08-17 PROCEDURE — 84145 PROCALCITONIN (PCT): CPT | Performed by: INTERNAL MEDICINE

## 2024-08-17 PROCEDURE — 36415 COLL VENOUS BLD VENIPUNCTURE: CPT | Performed by: EMERGENCY MEDICINE

## 2024-08-17 PROCEDURE — 82565 ASSAY OF CREATININE: CPT | Performed by: INTERNAL MEDICINE

## 2024-08-17 PROCEDURE — 85379 FIBRIN DEGRADATION QUANT: CPT | Performed by: INTERNAL MEDICINE

## 2024-08-17 PROCEDURE — 83036 HEMOGLOBIN GLYCOSYLATED A1C: CPT | Performed by: INTERNAL MEDICINE

## 2024-08-17 PROCEDURE — 94640 AIRWAY INHALATION TREATMENT: CPT

## 2024-08-17 PROCEDURE — 93005 ELECTROCARDIOGRAM TRACING: CPT

## 2024-08-17 PROCEDURE — 71046 X-RAY EXAM CHEST 2 VIEWS: CPT

## 2024-08-17 PROCEDURE — 99222 1ST HOSP IP/OBS MODERATE 55: CPT | Mod: AI | Performed by: INTERNAL MEDICINE

## 2024-08-17 PROCEDURE — 99285 EMERGENCY DEPT VISIT HI MDM: CPT | Mod: 25

## 2024-08-17 PROCEDURE — 250N000009 HC RX 250: Performed by: EMERGENCY MEDICINE

## 2024-08-17 PROCEDURE — 250N000013 HC RX MED GY IP 250 OP 250 PS 637: Performed by: INTERNAL MEDICINE

## 2024-08-17 PROCEDURE — 84484 ASSAY OF TROPONIN QUANT: CPT | Performed by: SOCIAL WORKER

## 2024-08-17 PROCEDURE — G0378 HOSPITAL OBSERVATION PER HR: HCPCS

## 2024-08-17 PROCEDURE — 96374 THER/PROPH/DIAG INJ IV PUSH: CPT

## 2024-08-17 PROCEDURE — 250N000013 HC RX MED GY IP 250 OP 250 PS 637: Performed by: SOCIAL WORKER

## 2024-08-17 PROCEDURE — 96372 THER/PROPH/DIAG INJ SC/IM: CPT | Performed by: INTERNAL MEDICINE

## 2024-08-17 PROCEDURE — 36415 COLL VENOUS BLD VENIPUNCTURE: CPT | Performed by: SOCIAL WORKER

## 2024-08-17 PROCEDURE — 83880 ASSAY OF NATRIURETIC PEPTIDE: CPT | Performed by: SOCIAL WORKER

## 2024-08-17 PROCEDURE — 250N000011 HC RX IP 250 OP 636: Performed by: SOCIAL WORKER

## 2024-08-17 PROCEDURE — 85025 COMPLETE CBC W/AUTO DIFF WBC: CPT | Performed by: EMERGENCY MEDICINE

## 2024-08-17 RX ORDER — CEFDINIR 300 MG/1
300 CAPSULE ORAL 2 TIMES DAILY
COMMUNITY
Start: 2024-08-10 | End: 2024-08-19

## 2024-08-17 RX ORDER — ALBUTEROL SULFATE 90 UG/1
1-2 AEROSOL, METERED RESPIRATORY (INHALATION) EVERY 4 HOURS PRN
Status: DISCONTINUED | OUTPATIENT
Start: 2024-08-17 | End: 2024-08-18 | Stop reason: HOSPADM

## 2024-08-17 RX ORDER — AMOXICILLIN 250 MG
2 CAPSULE ORAL 2 TIMES DAILY PRN
Status: DISCONTINUED | OUTPATIENT
Start: 2024-08-17 | End: 2024-08-17

## 2024-08-17 RX ORDER — ONDANSETRON 2 MG/ML
4 INJECTION INTRAMUSCULAR; INTRAVENOUS EVERY 6 HOURS PRN
Status: DISCONTINUED | OUTPATIENT
Start: 2024-08-17 | End: 2024-08-18 | Stop reason: HOSPADM

## 2024-08-17 RX ORDER — DOXYCYCLINE 100 MG/1
100 CAPSULE ORAL ONCE
Status: COMPLETED | OUTPATIENT
Start: 2024-08-17 | End: 2024-08-17

## 2024-08-17 RX ORDER — TRAMADOL HYDROCHLORIDE 50 MG/1
50 TABLET ORAL EVERY 6 HOURS PRN
Status: DISCONTINUED | OUTPATIENT
Start: 2024-08-17 | End: 2024-08-18 | Stop reason: HOSPADM

## 2024-08-17 RX ORDER — METHYLPREDNISOLONE SODIUM SUCCINATE 125 MG/2ML
125 INJECTION, POWDER, LYOPHILIZED, FOR SOLUTION INTRAMUSCULAR; INTRAVENOUS ONCE
Status: COMPLETED | OUTPATIENT
Start: 2024-08-17 | End: 2024-08-17

## 2024-08-17 RX ORDER — CEFEPIME HYDROCHLORIDE 2 G/1
2 INJECTION, POWDER, FOR SOLUTION INTRAVENOUS ONCE
Status: DISCONTINUED | OUTPATIENT
Start: 2024-08-17 | End: 2024-08-17

## 2024-08-17 RX ORDER — SIMVASTATIN 20 MG
40 TABLET ORAL AT BEDTIME
Status: DISCONTINUED | OUTPATIENT
Start: 2024-08-17 | End: 2024-08-18 | Stop reason: HOSPADM

## 2024-08-17 RX ORDER — ALBUTEROL SULFATE 0.83 MG/ML
2.5 SOLUTION RESPIRATORY (INHALATION) EVERY 6 HOURS PRN
Status: DISCONTINUED | OUTPATIENT
Start: 2024-08-17 | End: 2024-08-18 | Stop reason: HOSPADM

## 2024-08-17 RX ORDER — ASPIRIN 325 MG
325 TABLET ORAL ONCE
Status: COMPLETED | OUTPATIENT
Start: 2024-08-17 | End: 2024-08-17

## 2024-08-17 RX ORDER — AMOXICILLIN 250 MG
1-2 CAPSULE ORAL 2 TIMES DAILY
Status: DISCONTINUED | OUTPATIENT
Start: 2024-08-17 | End: 2024-08-18 | Stop reason: HOSPADM

## 2024-08-17 RX ORDER — AMLODIPINE BESYLATE 5 MG/1
5 TABLET ORAL DAILY
COMMUNITY

## 2024-08-17 RX ORDER — AZITHROMYCIN 250 MG/1
250 TABLET, FILM COATED ORAL
COMMUNITY

## 2024-08-17 RX ORDER — CEFDINIR 300 MG/1
300 CAPSULE ORAL 2 TIMES DAILY
Status: DISCONTINUED | OUTPATIENT
Start: 2024-08-17 | End: 2024-08-18 | Stop reason: HOSPADM

## 2024-08-17 RX ORDER — OXYBUTYNIN CHLORIDE 5 MG/1
10 TABLET, EXTENDED RELEASE ORAL EVERY EVENING
Status: DISCONTINUED | OUTPATIENT
Start: 2024-08-17 | End: 2024-08-18 | Stop reason: HOSPADM

## 2024-08-17 RX ORDER — IBUPROFEN 200 MG
400 TABLET ORAL AT BEDTIME
COMMUNITY

## 2024-08-17 RX ORDER — ONDANSETRON 4 MG/1
4 TABLET, ORALLY DISINTEGRATING ORAL EVERY 6 HOURS PRN
Status: DISCONTINUED | OUTPATIENT
Start: 2024-08-17 | End: 2024-08-18 | Stop reason: HOSPADM

## 2024-08-17 RX ORDER — AMOXICILLIN 250 MG
1 CAPSULE ORAL 2 TIMES DAILY PRN
Status: DISCONTINUED | OUTPATIENT
Start: 2024-08-17 | End: 2024-08-17

## 2024-08-17 RX ORDER — CLOPIDOGREL BISULFATE 75 MG/1
75 TABLET ORAL DAILY
Status: DISCONTINUED | OUTPATIENT
Start: 2024-08-18 | End: 2024-08-18 | Stop reason: HOSPADM

## 2024-08-17 RX ORDER — GABAPENTIN 100 MG/1
200 CAPSULE ORAL AT BEDTIME
Status: DISCONTINUED | OUTPATIENT
Start: 2024-08-17 | End: 2024-08-18 | Stop reason: HOSPADM

## 2024-08-17 RX ORDER — SODIUM CHLORIDE FOR INHALATION 3 %
3 VIAL, NEBULIZER (ML) INHALATION
COMMUNITY

## 2024-08-17 RX ORDER — ENOXAPARIN SODIUM 100 MG/ML
40 INJECTION SUBCUTANEOUS EVERY 24 HOURS
Status: DISCONTINUED | OUTPATIENT
Start: 2024-08-17 | End: 2024-08-18 | Stop reason: HOSPADM

## 2024-08-17 RX ORDER — GABAPENTIN 100 MG/1
200 CAPSULE ORAL AT BEDTIME
COMMUNITY

## 2024-08-17 RX ORDER — PREDNISONE 20 MG/1
20 TABLET ORAL SEE ADMIN INSTRUCTIONS
Status: ON HOLD | COMMUNITY
Start: 2024-08-10 | End: 2024-08-18

## 2024-08-17 RX ORDER — IPRATROPIUM BROMIDE AND ALBUTEROL SULFATE 2.5; .5 MG/3ML; MG/3ML
6 SOLUTION RESPIRATORY (INHALATION) ONCE
Status: COMPLETED | OUTPATIENT
Start: 2024-08-17 | End: 2024-08-17

## 2024-08-17 RX ORDER — AMLODIPINE BESYLATE 5 MG/1
5 TABLET ORAL DAILY
Status: DISCONTINUED | OUTPATIENT
Start: 2024-08-18 | End: 2024-08-18 | Stop reason: HOSPADM

## 2024-08-17 RX ADMIN — CEFDINIR 300 MG: 300 CAPSULE ORAL at 22:41

## 2024-08-17 RX ADMIN — SIMVASTATIN 40 MG: 20 TABLET, FILM COATED ORAL at 22:41

## 2024-08-17 RX ADMIN — ENOXAPARIN SODIUM 40 MG: 40 INJECTION SUBCUTANEOUS at 22:42

## 2024-08-17 RX ADMIN — IPRATROPIUM BROMIDE AND ALBUTEROL SULFATE 6 ML: .5; 3 SOLUTION RESPIRATORY (INHALATION) at 18:25

## 2024-08-17 RX ADMIN — METHYLPREDNISOLONE SODIUM SUCCINATE 125 MG: 125 INJECTION, POWDER, FOR SOLUTION INTRAMUSCULAR; INTRAVENOUS at 17:59

## 2024-08-17 RX ADMIN — GABAPENTIN 200 MG: 100 CAPSULE ORAL at 22:41

## 2024-08-17 RX ADMIN — OXYBUTYNIN CHLORIDE 10 MG: 5 TABLET, EXTENDED RELEASE ORAL at 22:41

## 2024-08-17 RX ADMIN — ASPIRIN 325 MG ORAL TABLET 325 MG: 325 PILL ORAL at 20:11

## 2024-08-17 RX ADMIN — SENNOSIDES AND DOCUSATE SODIUM 1 TABLET: 50; 8.6 TABLET ORAL at 22:41

## 2024-08-17 RX ADMIN — DOXYCYCLINE HYCLATE 100 MG: 100 CAPSULE ORAL at 20:11

## 2024-08-17 ASSESSMENT — ACTIVITIES OF DAILY LIVING (ADL)
ADLS_ACUITY_SCORE: 38
ADLS_ACUITY_SCORE: 34
ADLS_ACUITY_SCORE: 38
ADLS_ACUITY_SCORE: 38

## 2024-08-17 ASSESSMENT — COLUMBIA-SUICIDE SEVERITY RATING SCALE - C-SSRS
6. HAVE YOU EVER DONE ANYTHING, STARTED TO DO ANYTHING, OR PREPARED TO DO ANYTHING TO END YOUR LIFE?: NO
1. IN THE PAST MONTH, HAVE YOU WISHED YOU WERE DEAD OR WISHED YOU COULD GO TO SLEEP AND NOT WAKE UP?: NO
2. HAVE YOU ACTUALLY HAD ANY THOUGHTS OF KILLING YOURSELF IN THE PAST MONTH?: NO

## 2024-08-17 NOTE — ED TRIAGE NOTES
Pt arrives via EMS for 1 week of progressing SOB and mucus she is unable to cough up. Hx of asthma and COPD and has flare ups every few months. Congested in chest and does not have the energy to cough the mucus all the way up so she panics and feels it blocking airway. 95% on RA. Tachypenic      Triage Assessment (Adult)       Row Name 08/17/24 7189          Respiratory WDL    Respiratory WDL X;cough;expansion/retractions;rhythm/pattern     Rhythm/Pattern, Respiratory shortness of breath;tachypneic     Cough Type congested;productive        Cardiac WDL    Cardiac WDL WDL

## 2024-08-18 ENCOUNTER — APPOINTMENT (OUTPATIENT)
Dept: CARDIOLOGY | Facility: CLINIC | Age: 89
End: 2024-08-18
Attending: STUDENT IN AN ORGANIZED HEALTH CARE EDUCATION/TRAINING PROGRAM
Payer: MEDICARE

## 2024-08-18 VITALS
DIASTOLIC BLOOD PRESSURE: 68 MMHG | BODY MASS INDEX: 21.95 KG/M2 | HEIGHT: 63 IN | RESPIRATION RATE: 16 BRPM | SYSTOLIC BLOOD PRESSURE: 147 MMHG | TEMPERATURE: 97.8 F | OXYGEN SATURATION: 95 % | WEIGHT: 123.9 LBS | HEART RATE: 69 BPM

## 2024-08-18 LAB
ANION GAP SERPL CALCULATED.3IONS-SCNC: 12 MMOL/L (ref 7–15)
BUN SERPL-MCNC: 20.2 MG/DL (ref 8–23)
CALCIUM SERPL-MCNC: 9.2 MG/DL (ref 8.8–10.4)
CHLORIDE SERPL-SCNC: 101 MMOL/L (ref 98–107)
CREAT SERPL-MCNC: 0.64 MG/DL (ref 0.51–0.95)
EGFRCR SERPLBLD CKD-EPI 2021: 85 ML/MIN/1.73M2
GLUCOSE SERPL-MCNC: 145 MG/DL (ref 70–99)
HCO3 SERPL-SCNC: 25 MMOL/L (ref 22–29)
LVEF ECHO: NORMAL
POTASSIUM SERPL-SCNC: 3.7 MMOL/L (ref 3.4–5.3)
SODIUM SERPL-SCNC: 138 MMOL/L (ref 135–145)
TROPONIN T SERPL HS-MCNC: 27 NG/L

## 2024-08-18 PROCEDURE — G0378 HOSPITAL OBSERVATION PER HR: HCPCS

## 2024-08-18 PROCEDURE — 99239 HOSP IP/OBS DSCHRG MGMT >30: CPT | Performed by: STUDENT IN AN ORGANIZED HEALTH CARE EDUCATION/TRAINING PROGRAM

## 2024-08-18 PROCEDURE — 250N000013 HC RX MED GY IP 250 OP 250 PS 637: Performed by: INTERNAL MEDICINE

## 2024-08-18 PROCEDURE — 93306 TTE W/DOPPLER COMPLETE: CPT | Mod: 26 | Performed by: INTERNAL MEDICINE

## 2024-08-18 PROCEDURE — 80048 BASIC METABOLIC PNL TOTAL CA: CPT | Performed by: INTERNAL MEDICINE

## 2024-08-18 PROCEDURE — 255N000002 HC RX 255 OP 636: Performed by: INTERNAL MEDICINE

## 2024-08-18 PROCEDURE — 36415 COLL VENOUS BLD VENIPUNCTURE: CPT | Performed by: INTERNAL MEDICINE

## 2024-08-18 PROCEDURE — 84484 ASSAY OF TROPONIN QUANT: CPT | Performed by: STUDENT IN AN ORGANIZED HEALTH CARE EDUCATION/TRAINING PROGRAM

## 2024-08-18 PROCEDURE — 999N000208 ECHOCARDIOGRAM COMPLETE

## 2024-08-18 RX ORDER — PREDNISONE 10 MG/1
20 TABLET ORAL DAILY
Qty: 10 TABLET | Refills: 0 | Status: SHIPPED | OUTPATIENT
Start: 2024-08-18 | End: 2024-08-23

## 2024-08-18 RX ADMIN — HUMAN ALBUMIN MICROSPHERES AND PERFLUTREN 4 ML: 10; .22 INJECTION, SOLUTION INTRAVENOUS at 12:41

## 2024-08-18 RX ADMIN — HYDROCHLOROTHIAZIDE: 12.5 CAPSULE ORAL at 08:34

## 2024-08-18 RX ADMIN — CLOPIDOGREL BISULFATE 75 MG: 75 TABLET ORAL at 08:34

## 2024-08-18 RX ADMIN — CEFDINIR 300 MG: 300 CAPSULE ORAL at 08:34

## 2024-08-18 RX ADMIN — AMLODIPINE BESYLATE 5 MG: 5 TABLET ORAL at 08:35

## 2024-08-18 ASSESSMENT — ACTIVITIES OF DAILY LIVING (ADL)
ADLS_ACUITY_SCORE: 36
ADLS_ACUITY_SCORE: 34
ADLS_ACUITY_SCORE: 36
ADLS_ACUITY_SCORE: 34
ADLS_ACUITY_SCORE: 35
ADLS_ACUITY_SCORE: 35
ADLS_ACUITY_SCORE: 36
ADLS_ACUITY_SCORE: 35

## 2024-08-18 NOTE — DISCHARGE SUMMARY
[facility]  Hospitalist Discharge Summary      Date of Admission:  8/17/2024  Date of Discharge:  8/18/2024  Discharging Provider: Inocente Heath MD  Discharge Service: Hospitalist Service    Discharge Diagnoses     COPD exacerbation.  New decrease in ejection fraction.  History of CAD, status post CABG  Hyponatremia.  Hyperglycemia due to prednisone dose.  Essential hypertension.    Clinically Significant Risk Factors          Follow-ups Needed After Discharge   Follow-up Appointments     Follow-up and recommended labs and tests       Follow up with primary care provider, Yas Sebastian, within 7 days for   hospital follow- up.     Follow-up with cardiology in 2 week for new decrease in ejection fraction   on echocardiogram with regional wall motion abnormalities.            Discharge Disposition   Discharged to home  Condition at discharge: Stable    Hospital Course   89-year-old female with history of CAD status post CABG in 1996 and COPD who presented with 1 week history of nonproductive cough and shortness of breath.  She did not have any chest pain.    Symptoms had resolved.  She is she received Solu-Medrol and DuoNebs.  She also got doxycycline in the ER but antibiotics were not continued.  She will finish her prior to admission dose of cefdinir which is supposed to finish on 8/19 and had finished azithromycin on 8/16.  Patient was advised to continue her inhalers and will send with 5-day course of prednisone.    Troponin was mildly elevated at 25--> 35--> 27.  Echocardiogram showed EF of 45 % with regional wall motion abnormalities.  I recommended patient to stay for cardiology consultation but she is eager to leave and wants to follow-up with cardiology outpatient.  Given absence of chest pain and stable troponin this is probably okay but I advised patient that she could have coronary artery disease and may be a risk of myocardial infarction.    Consultations This Hospital Stay    CARDIOLOGY IP CONSULT    Code Status   Full Code    Time Spent on this Encounter   I, Inocente Heath MD, personally saw the patient today and spent greater than 30 minutes discharging this patient.       Inocente Heath MD  [facility] OBSERVATION DEPT  [address] [place] [place] [address]  Phone: [phone]  ______________________________________________________________________    Physical Exam   Vital Signs: Temp: 97.8  F (36.6  C) Temp src: Oral BP: (!) 147/68 Pulse: 69   Resp: 16 SpO2: 95 % O2 Device: None (Room air)    Weight: 123 lbs 14.38 oz  General Appearance: Alert awake and oriented x 3  Respiratory: Clear to auscultation  Cardiovascular: S1-S2 normal  GI: Soft and nontender  Skin: No rash  Other: No edema         Primary Care Physician   Yas Sebastian    Discharge Orders      Reason for your hospital stay    COPD exacerbation     Follow-up and recommended labs and tests     Follow up with primary care provider, Yas Sebastian, within 7 days for hospital follow- up.     Follow-up with cardiology in 2 week for new decrease in ejection fraction on echocardiogram with regional wall motion abnormalities.     Activity    Your activity upon discharge: activity as tolerated and activity as tolerated     Diet    Follow this diet upon discharge: Orders Placed This Encounter      Low Saturated Fat Na <2400 mg       Significant Results and Procedures   Most Recent 3 CBC's:  Recent Labs   Lab Test 08/17/24  1703 04/24/22  0512 01/20/21  0540 07/24/16  0712   WBC 8.8 9.4  --  8.3   HGB 13.1 14.4 11.4* 12.6   MCV 93 95  --  98    262  --  220     Most Recent 3 BMP's:  Recent Labs   Lab Test 08/18/24  0602 08/17/24  1918 08/17/24  1703 04/24/22  0512     --  134* 133   POTASSIUM 3.7  --  4.0 3.6   CHLORIDE 101  --  96* 101   CO2 25  --  24 27   BUN 20.2  --  24.4* 16   CR 0.64 0.67 0.69 0.53   ANIONGAP 12  --  14 5   BENEDICTO 9.2  --  9.3 9.8   *  --  209* 115*     Most Recent 2  LFT's:  Recent Labs   Lab Test 16  0105   AST 14   ALT 19   ALKPHOS 64   BILITOTAL 0.3   ,   Results for orders placed or performed during the hospital encounter of 24   XR Chest 2 Views    Narrative    EXAM: XR CHEST 2 VIEWS  LOCATION: [facility]  DATE: 2024    INDICATION: cough, dyspnea  COMPARISON: 2022      Impression    IMPRESSION: Median sternotomy wires and CABG related surgical clips. Mildly enlarged heart. Small right pleural effusion. Mild pulmonary congestion. No acute fracture dislocation. Right shoulder arthroplasty.   Echocardiogram Complete     Value    LVEF  40-45%    Narrative    [id number]  [id number]  [id number]  [id number]^JORJE^MARY LOU     [facility]  Echocardiography Laboratory  [address] [place] [address]     Name: COLEMAN MELLO  MRN: [id number]  : 1935  Study Date: 2024 12:05 PM  Age: 89 yrs  Gender: Female  Patient [facility]  Reason For Study: Dyspnea  Ordering Physician: MARY LOU RECINOS  Referring Physician: Yas Sebastian  Performed By: Rosemary Murillo     BSA: 1.6 m2  Height: 63 in  Weight: 123 lb  HR: 74  BP: 141/65 mmHg  ______________________________________________________________________________  Procedure  Complete Portable Echo Adult. Optison (NDC #[id number]) given intravenously.  Technically difficult study.  ______________________________________________________________________________  Interpretation Summary     1. LV is mildly dilated, with mild-moderately reduced LV systolic function.  LVEF estimated at 40-45%. Grade II (moderate) diastolic dysfunction.  2. There are regional variations in LV wall motion: moderate hypokinesis of  the anterior and anterolateral walls, and the apex. Mild hypokinesis of the  anteroseptum and inferolateral walls. Inferior and inferoseptal contractility  appears fairly normal.  3. Normal RV size with borderline reduced RV systolic function.  4. LA is severely  dilated. RA visually appears mildly dilated, though semi-  quantitative volume measurement is normal.  5. No significant valvular stenosis or regurgitation.  6. No pericardial effusion.     No prior study is available for comparison.  ______________________________________________________________________________  Left Ventricle  Grade II or moderate diastolic dysfunction. Left ventricular systolic function  is moderately reduced. The visual ejection fraction is 40-45%. Moderate  hypokinesis of the anterior and anterolateral walls, and the apex. Mild  hypokinesis of the anteroseptum and inferolateral walls. Inferior and  inferoseptal contractility appears fairly normal.     Right Ventricle  The right ventricle is normal size. The right ventricular systolic function is  borderline reduced.     Atria  The left atrium is severely dilated. RA visually appears mildly dilated. There  is no color Doppler evidence of an atrial shunt.     Mitral Valve  The mitral valve leaflets are mildly thickened. There is mild mitral annular  calcification. There is mild (1+) mitral regurgitation. There is no mitral  valve stenosis.     Tricuspid Valve  The tricuspid valve is normal in structure and function. There is physiologic  tricuspid regurgitation. Right ventricular systolic pressure could not be  approximated due to inadequate tricuspid regurgitation. IVC diameter <2.1 cm  collapsing >50% with sniff suggests a normal RA pressure of 3 mmHg.     Aortic Valve  There is mild trileaflet aortic sclerosis. No aortic regurgitation is present.  No aortic stenosis is present.     Pulmonic Valve  The pulmonic valve is not well visualized.     Vessels  The aortic root is normal size. Normal size ascending aorta.     Pericardium  There is no pericardial effusion.     ______________________________________________________________________________  MMode/2D Measurements & Calculations  IVC diam: 1.6 cm  Ao root diam: 3.7 cm  asc Aorta Diam: 3.4  cm  LVOT diam: 2.0 cm  LVOT area: 3.2 cm2     Ao root diam index Ht(cm/m): 2.3  Ao root diam index BSA (cm/m2): 2.4  Asc Ao diam index BSA (cm/m2): 2.1  Asc Ao diam index Ht(cm/m): 2.1  LA Volume (BP): 73.1 ml  LA Volume Index (BP): 46.6 ml/m2  RV Base: 2.9 cm  TAPSE: 2.1 cm     Doppler Measurements & Calculations  MV E max cong: 78.6 cm/sec  MV A max cong: 84.9 cm/sec  MV E/A: 0.93  MV max PG: 3.8 mmHg  MV mean P.0 mmHg  MV V2 VTI: 32.5 cm  MVA(VTI): 2.2 cm2  MV dec slope: 346.1 cm/sec2  MV dec time: 0.23 sec  Ao V2 max: 129.0 cm/sec  Ao max P.0 mmHg  Ao V2 mean: 86.8 cm/sec  Ao mean P.0 mmHg  Ao V2 VTI: 33.3 cm  JOSE M(I,D): 2.1 cm2  JOSE M(V,D): 2.3 cm2  LV V1 max PG: 3.4 mmHg  LV V1 max: 92.8 cm/sec  LV V1 VTI: 21.7 cm  SV(LVOT): 70.1 ml  SI(LVOT): 44.6 ml/m2  PA V2 max: 95.1 cm/sec  PA max PG: 3.6 mmHg  PA acc time: 0.07 sec  AV Cong Ratio (DI): 0.72  JOSE M Index (cm2/m2): 1.3  E/E' avg: 10.3  Lateral E/e': 8.1  Medial E/e': 12.5  RV S Cong: 9.0 cm/sec     ______________________________________________________________________________  Report approved by: MD Jason Chin 2024 01:57 PM             Discharge Medications   Current Discharge Medication List        CONTINUE these medications which have NOT CHANGED    Details   albuterol (PROAIR HFA, PROVENTIL HFA, VENTOLIN HFA) 108 (90 BASE) MCG/ACT inhaler Inhale 1-2 puffs into the lungs every 4 hours as needed for shortness of breath / dyspnea or wheezing      albuterol (PROVENTIL) (2.5 MG/3ML) 0.083% neb solution Take 1 vial (2.5 mg) by nebulization every 6 hours as needed for shortness of breath / dyspnea or wheezing  Qty: 75 mL, Refills: 0    Comments: j44.9      amLODIPine (NORVASC) 5 MG tablet Take 5 mg by mouth daily      azithromycin (ZITHROMAX) 250 MG tablet Take 250 mg by mouth three times a week Take on Monday, Wednesday, and Friday.      cefdinir (OMNICEF) 300 MG capsule Take 300 mg by mouth 2 times daily For 10 days      CLOPIDOGREL BISULFATE  PO Take 75 mg by mouth daily      gabapentin (NEURONTIN) 100 MG capsule Take 200 mg by mouth at bedtime      ibuprofen (ADVIL/MOTRIN) 200 MG tablet Take 400 mg by mouth at bedtime      losartan-hydrochlorothiazide (HYZAAR) 100-12.5 MG tablet Take 1 tablet by mouth daily      oxybutynin ER (DITROPAN-XL) 10 MG 24 hr tablet Take 10 mg by mouth every evening      predniSONE (DELTASONE) 20 MG tablet Take 20 mg by mouth See Admin Instructions Take 20 mg by mouth twice daily for 5 days, then take 20 mg by mouth once daily for 5 days.      psyllium (METAMUCIL/KONSYL) Packet Take 1 packet by mouth daily      senna-docusate (SENOKOT-S/PERICOLACE) 8.6-50 MG tablet Take 1-2 tablets by mouth 2 times daily Take while on oral narcotics to prevent or treat constipation.  Qty: 30 tablet, Refills: 0    Comments: While taking narcotics  Associated Diagnoses: Primary osteoarthritis of right shoulder      Simvastatin (ZOCOR PO) Take 40 mg by mouth At Bedtime       sodium chloride (NEBUSAL) 3 % neb solution Take 3 mLs by nebulization three times a week      tiotropium-olodaterol 2.5-2.5 MCG/ACT AERS Inhale 2 puffs into the lungs every evening      traMADol (ULTRAM) 50 MG tablet Take 1 tablet (50 mg) by mouth every 6 hours as needed for moderate to severe pain  Qty: 30 tablet, Refills: 0    Associated Diagnoses: Primary osteoarthritis of right shoulder           Allergies   No Known Allergies

## 2024-08-18 NOTE — PHARMACY-ADMISSION MEDICATION HISTORY
Pharmacist Admission Medication History    Admission medication history is complete. The information provided in this note is only as accurate as the sources available at the time of the update.    Information Source(s): Patient and CareEverywhere/SureScripts via in-person    Pertinent Information: Azra is on day 8 of 10 for the prednisone and cefdinir course of therapy. Azra said she was told to use her saline nebulizer three times weekly but feels it causes more phlegm and doesn't help her so she often uses it as little as possible.    Changes made to PTA medication list:  Added: amlodipine, azithromycin, Stiolto Respimat, ibuprofen, prednisone, cefdinir, metamucil, saline nebulizer  Deleted: Combivent Respimat, Tylenol, alendronate, celecoxib, Flovent HFA, hydroxyzine (all discontinued)  Changed: gabapentin (changed from 100 mg - 300 mg nightly to 200 mg nightly)    Allergies reviewed with patient and updates made in EHR: yes    Medication History Completed By: Blake Grissom [facility] 8/17/2024 8:50 PM    PTA Med List   Medication Sig Last Dose    albuterol (PROAIR HFA, PROVENTIL HFA, VENTOLIN HFA) 108 (90 BASE) MCG/ACT inhaler Inhale 1-2 puffs into the lungs every 4 hours as needed for shortness of breath / dyspnea or wheezing 8/17/2024    albuterol (PROVENTIL) (2.5 MG/3ML) 0.083% neb solution Take 1 vial (2.5 mg) by nebulization every 6 hours as needed for shortness of breath / dyspnea or wheezing 8/17/2024    amLODIPine (NORVASC) 5 MG tablet Take 5 mg by mouth daily 8/16/2024 at PM    azithromycin (ZITHROMAX) 250 MG tablet Take 250 mg by mouth three times a week Take on Monday, Wednesday, and Friday. 8/16/2024    cefdinir (OMNICEF) 300 MG capsule Take 300 mg by mouth 2 times daily For 10 days 8/17/2024 at AM    CLOPIDOGREL BISULFATE PO Take 75 mg by mouth daily 8/17/2024 at AM    gabapentin (NEURONTIN) 100 MG capsule Take 200 mg by mouth at bedtime 8/16/2024 at PM    ibuprofen (ADVIL/MOTRIN) 200 MG tablet Take  400 mg by mouth at bedtime 8/16/2024 at PM    losartan-hydrochlorothiazide (HYZAAR) 100-12.5 MG tablet Take 1 tablet by mouth daily 8/17/2024 at AM    oxybutynin ER (DITROPAN-XL) 10 MG 24 hr tablet Take 10 mg by mouth every evening 8/16/2024 at PM    predniSONE (DELTASONE) 20 MG tablet Take 20 mg by mouth See Admin Instructions Take 20 mg by mouth twice daily for 5 days, then take 20 mg by mouth once daily for 5 days. 8/17/2024 at AM    psyllium (METAMUCIL/KONSYL) Packet Take 1 packet by mouth daily 8/17/2024 at 1600    senna-docusate (SENOKOT-S/PERICOLACE) 8.6-50 MG tablet Take 1-2 tablets by mouth 2 times daily Take while on oral narcotics to prevent or treat constipation. 8/16/2024 at PM    Simvastatin (ZOCOR PO) Take 40 mg by mouth At Bedtime  8/16/2024 at PM    sodium chloride (NEBUSAL) 3 % neb solution Take 3 mLs by nebulization three times a week Past Week    tiotropium-olodaterol 2.5-2.5 MCG/ACT AERS Inhale 2 puffs into the lungs every evening 8/16/2024 at PM    traMADol (ULTRAM) 50 MG tablet Take 1 tablet (50 mg) by mouth every 6 hours as needed for moderate to severe pain Unknown

## 2024-08-18 NOTE — PLAN OF CARE
Patient's After Visit Summary was reviewed with patient and/or spouse.   Patient verbalized understanding of After Visit Summary, recommended follow up and was given an opportunity to ask questions.   Discharge medications sent home with patient/family: No   Discharged with spouse  OBSERVATION patient END time: 3966

## 2024-08-18 NOTE — ED NOTES
[facility]  ED Nurse Handoff Report    ED Chief complaint: Shortness of Breath  . ED Diagnosis:   Final diagnoses:   None       Allergies: No Known Allergies    Code Status: Full Code    Activity level - Baseline/Home:  walker.  Activity Level - Current:   standby.   Lift room needed: No.   Bariatric: No   Needed: No   Isolation: No.   Infection: Not Applicable.     Respiratory status: Room air    Vital Signs (within 30 minutes):   Vitals:    08/17/24 1830 08/17/24 1900 08/17/24 1953 08/17/24 2000   BP:    135/83   Pulse:    86   Resp:    20   Temp:  97.6  F (36.4  C)     SpO2: 99% 91% 94% 93%   Weight:           Cardiac Rhythm:  ,      Pain level:    Patient confused: No.   Patient Falls Risk: nonskid shoes/slippers when out of bed, patient and family education, assistive device/personal items within reach, and mobility aid in reach.   Elimination Status: Has voided     Patient Report - Initial Complaint: shortness of breath.   Focused Assessment: RespiratoryAirway WDL: WDL; airway symptoms  Respiratory WDLRespiratory WDL: rhythm/pattern; expansion/retractionsRhythm/Pattern, Respiratory: shortness of breath; tachypneicCough Type: congested; no productive sputum    Abnormal Results:   Labs Ordered and Resulted from Time of ED Arrival to Time of ED Departure   BASIC METABOLIC PANEL - Abnormal       Result Value    Sodium 134 (*)     Potassium 4.0      Chloride 96 (*)     Carbon Dioxide (CO2) 24      Anion Gap 14      Urea Nitrogen 24.4 (*)     Creatinine 0.69      GFR Estimate 84      Calcium 9.3      Glucose 209 (*)    CBC WITH PLATELETS AND DIFFERENTIAL - Abnormal    WBC Count 8.8      RBC Count 4.26      Hemoglobin 13.1      Hematocrit 39.8      MCV 93      MCH 30.8      MCHC 32.9      RDW 12.6      Platelet Count 287      % Neutrophils 85      % Lymphocytes 8      % Monocytes 6      % Eosinophils 0      % Basophils 0      % Immature Granulocytes 1      NRBCs per 100 WBC 0       Absolute Neutrophils 7.5      Absolute Lymphocytes 0.7 (*)     Absolute Monocytes 0.5      Absolute Eosinophils 0.0      Absolute Basophils 0.0      Absolute Immature Granulocytes 0.1      Absolute NRBCs 0.0     TROPONIN T, HIGH SENSITIVITY - Abnormal    Troponin T, High Sensitivity 25 (*)    TROPONIN T, HIGH SENSITIVITY - Abnormal    Troponin T, High Sensitivity 35 (*)    INFLUENZA A/B, RSV, & SARS-COV2 PCR - Normal    Influenza A PCR Negative      Influenza B PCR Negative      RSV PCR Negative      SARS CoV2 PCR Negative     NT PROBNP INPATIENT - Normal    N terminal Pro BNP Inpatient 856          XR Chest 2 Views   Final Result   IMPRESSION: Median sternotomy wires and CABG related surgical clips. Mildly enlarged heart. Small right pleural effusion. Mild pulmonary congestion. No acute fracture dislocation. Right shoulder arthroplasty.          Treatments provided: see MAR  Family Comments:  at bedside  OBS brochure/video discussed/provided to patient:  Yes  ED Medications:   Medications   ipratropium - albuterol 0.5 mg/2.5 mg/3 mL (DUONEB) neb solution 6 mL (6 mLs Nebulization $Given 8/17/24 6273)   methylPREDNISolone Na Suc (solu-MEDROL) injection 125 mg (125 mg Intravenous $Given 8/17/24 3040)   doxycycline hyclate (VIBRAMYCIN) capsule 100 mg (100 mg Oral $Given 8/17/24 2011)   aspirin (ASA) tablet 325 mg (325 mg Oral $Given 8/17/24 2011)       Drips infusing:  No  For the majority of the shift this patient was Green.   Interventions performed were none needed.    Sepsis treatment initiated: No    Cares/treatment/interventions/medications to be completed following ED care: continue to monitor cardiac enzymes.    ED Nurse Name: Nuris Torres RN  8:36 PM     RECEIVING UNIT ED HANDOFF REVIEW    Above ED Nurse Handoff Report was reviewed: Yes  Reviewed by: Delmy Oviedo RN on August 17, 2024 at 10:10 PM   RANJIT Irene called the ED to inform them the note was read: Yes

## 2024-08-18 NOTE — PLAN OF CARE
ROOM # 208-1    Living Situation (if not independent, order SW consult): Home w/ spouse  Facility name:  : Fadi, spouse     Activity level at baseline: IND w/ walker  Activity level on admit: SBA w/ walker     Who will be transporting you at discharge: Fadi, spouse     Patient registered to observation; given Patient Bill of Rights; given the opportunity to ask questions about observation status and their plan of care.  Patient has been oriented to the observation room, bathroom and call light is in place.    Discussed discharge goals and expectations with patient/family.

## 2024-08-18 NOTE — PLAN OF CARE
"PRIMARY DIAGNOSIS: COUGH, ELEVATED TROP  OUTPATIENT/OBSERVATION GOALS TO BE MET BEFORE DISCHARGE:  1. Pain Status: Pain free.    2. Return to near baseline physical activity: Yes    3. Cleared for discharge by consultants (if involved): N/A    Discharge Planner Nurse   Safe discharge environment identified: Yes  Barriers to discharge: Yes       Entered by: CHIQUITA GUIDO RN 08/18/2024    Vital signs:  Temp: 97.8  F (36.6  C) Temp src: Oral BP: (!) 147/68 Pulse: 69   Resp: 16 SpO2: 95 % O2 Device: None (Room air) None (Room air)Alert and oriented x4. Denies pain, SOB, chest pain. Sat stable on RA. Echo completed. Result pending. Moves SBA with a walker. Steady on feet. Call light in reach. Plan discharge home pending echo result.    Please review provider order for any additional goals.   Nurse to notify provider when observation goals have been met and patient is ready for discharge.        Problem: Adult Inpatient Plan of Care  Goal: Plan of Care Review  Description: The Plan of Care Review/Shift note should be completed every shift.  The Outcome Evaluation is a brief statement about your assessment that the patient is improving, declining, or no change.  This information will be displayed automatically on your shift  note.  8/18/2024 1333 by Chiquita Guido RN  Outcome: Progressing  8/18/2024 1106 by Chiquita Guido RN  Outcome: Progressing  Goal: Patient-Specific Goal (Individualized)  Description: You can add care plan individualizations to a care plan. Examples of Individualization might be:  \"Parent requests to be called daily at 9am for status\", \"I have a hard time hearing out of my right ear\", or \"Do not touch me to wake me up as it startles  me\".  8/18/2024 1333 by Chiquita Guido RN  Outcome: Progressing  8/18/2024 1106 by Chiquita Guido RN  Outcome: Progressing  Goal: Absence of Hospital-Acquired Illness or Injury  8/18/2024 1333 by Chiquita Guido RN  Outcome: " Progressing  8/18/2024 1106 by Tory English RN  Outcome: Progressing  Intervention: Identify and Manage Fall Risk  Recent Flowsheet Documentation  Taken 8/18/2024 1000 by Tory English RN  Safety Promotion/Fall Prevention:   activity supervised   clutter free environment maintained   room organization consistent   safety round/check completed  Intervention: Prevent Skin Injury  Recent Flowsheet Documentation  Taken 8/18/2024 1000 by Tory English RN  Body Position: position changed independently  Intervention: Prevent and Manage VTE (Venous Thromboembolism) Risk  Recent Flowsheet Documentation  Taken 8/18/2024 1000 by Tory English RN  VTE Prevention/Management: SCDs off (sequential compression devices)  Goal: Optimal Comfort and Wellbeing  8/18/2024 1333 by Tory English RN  Outcome: Progressing  8/18/2024 1106 by Tory English RN  Outcome: Progressing  Goal: Readiness for Transition of Care  8/18/2024 1333 by Tory English RN  Outcome: Progressing  8/18/2024 1106 by Tory English RN  Outcome: Progressing     Problem: Airway Clearance Ineffective  Goal: Effective Airway Clearance  8/18/2024 1333 by Tory English RN  Outcome: Progressing  8/18/2024 1106 by Tory English RN  Outcome: Progressing  Intervention: Promote Airway Secretion Clearance  Recent Flowsheet Documentation  Taken 8/18/2024 1000 by Tory English RN  Activity Management: activity adjusted per tolerance

## 2024-08-18 NOTE — PLAN OF CARE
PRIMARY DIAGNOSIS: SOB   OUTPATIENT/OBSERVATION GOALS TO BE MET BEFORE DISCHARGE:  Dyspnea improved and O2 sats >88% at RA or at prior home O2 therapy level: Yes        SpO2: 93 %, O2 Device: None (Room air)  Vitals:    08/17/24 1647 08/17/24 2233   Weight: 56.7 kg (125 lb) 56.2 kg (123 lb 14.4 oz)        ECHO and other diagnostic testing complete (if applicable):  echo in AM    Return to near baseline physical activity: Yes    Discharge Planner Nurse   Safe discharge environment identified: Yes  Barriers to discharge: Yes       Entered by: Delmy Oviedo RN 08/18/2024 2:06 AM     Please review provider order for any additional goals.   Nurse to notify provider when observation goals have been met and patient is ready for discharge.    Vitals are Temp: 97.9  F (36.6  C) Temp src: Oral BP: (!) 151/71 Pulse: 88   Resp: 20 SpO2: 93 %.  Patient is Alert and Oriented x4. They are SBA with Walker.  Pt is a Low fat diet.  They are denying pain.  Patient is Saline locked.  Denies nausea/dizziness. Denies SOB, states it only occurs when unable to cough up phlegm. Continuous pulse ox on. RA. Congested cough. On tele, SR BBB. Plan is for echo in the morning.

## 2024-08-18 NOTE — PLAN OF CARE
PRIMARY DIAGNOSIS: SOB   OUTPATIENT/OBSERVATION GOALS TO BE MET BEFORE DISCHARGE:  Dyspnea improved and O2 sats >88% at RA or at prior home O2 therapy level: Yes        SpO2: 93 %, O2 Device: None (Room air)  Vitals:    08/17/24 1647 08/17/24 2233   Weight: 56.7 kg (125 lb) 56.2 kg (123 lb 14.4 oz)        ECHO and other diagnostic testing complete (if applicable):  echo in AM    Return to near baseline physical activity: Yes    Discharge Planner Nurse   Safe discharge environment identified: Yes  Barriers to discharge: Yes       Entered by: Delmy Oviedo RN 08/18/2024 4:51 AM     Please review provider order for any additional goals.   Nurse to notify provider when observation goals have been met and patient is ready for discharge.    Vitals are Temp: 97.9  F (36.6  C) Temp src: Oral BP: (!) 147/80 Pulse: 79   Resp: 16 SpO2: 94 %.  Patient is Alert and Oriented x4. They are SBA with Walker.  Patient is Saline locked. Continuous pulse ox on. RA. Congested cough. On tele, SR BBB. Plan is for echo in the morning.

## 2024-08-18 NOTE — PLAN OF CARE
"PRIMARY DIAGNOSIS: COUGH, ELEVATED TROP  OUTPATIENT/OBSERVATION GOALS TO BE MET BEFORE DISCHARGE:  1. Pain Status: Pain free.    2. Return to near baseline physical activity: Yes    3. Cleared for discharge by consultants (if involved): N/A    Discharge Planner Nurse   Safe discharge environment identified: Yes  Barriers to discharge: Yes       Entered by: CHIQUITA GUIDO RN 08/18/2024    Vital signs:  Temp: 97.9  F (36.6  C) Temp src: Oral BP: (!) 141/65 Pulse: 79   Resp: 16 SpO2: 95 % O2 Device: None (Room air)Alert and oriented x4. Denies pain, SOB, chest pain. Sat stable on RA. Echo to be done today. Moves SBA with a walker. Steady on feet. Call light in reach. Plan discharge home pending echo result.      Please review provider order for any additional goals.   Nurse to notify provider when observation goals have been met and patient is ready for discharge.         Problem: Adult Inpatient Plan of Care  Goal: Plan of Care Review  Description: The Plan of Care Review/Shift note should be completed every shift.  The Outcome Evaluation is a brief statement about your assessment that the patient is improving, declining, or no change.  This information will be displayed automatically on your shift  note.  Outcome: Progressing  Goal: Patient-Specific Goal (Individualized)  Description: You can add care plan individualizations to a care plan. Examples of Individualization might be:  \"Parent requests to be called daily at 9am for status\", \"I have a hard time hearing out of my right ear\", or \"Do not touch me to wake me up as it startles  me\".  Outcome: Progressing  Goal: Absence of Hospital-Acquired Illness or Injury  Outcome: Progressing  Goal: Optimal Comfort and Wellbeing  Outcome: Progressing  Goal: Readiness for Transition of Care  Outcome: Progressing     Problem: Airway Clearance Ineffective  Goal: Effective Airway Clearance  Outcome: Progressing                    "

## 2024-08-18 NOTE — H&P
[facility] History [facility] Physical  Department of Internal Medicine    Patient Name: Azra Christine MRN# [id number]   Age: 89 year old YOB: 1935     Date of Admission:8/17/2024      Primary care provider: Yas Sebastian  Date of Service: 8/17/2024         Assessment and Plan:   Azra Christine is a 89 year old female with past medical history of CAD status post CABG 1996, COPD, hypertension presented to the ED with complaints of cough, nonproductive and difficulty breathing.    Dyspnea and respiratory normalities.  Suspect secondary to COPD with exacerbation.  Angina dyspnea equivalent is in the differential diagnosis.  Presenting symptoms now completely resolved.  EKG showed sinus rhythm with nonspecific changes.  No STEMI.  Chest x-ray negative for pleural effusion, pulmonary edema or pneumonia consolidation.  High-sensitivity troponin elevated and trended up.  No any chest pain.  Received Solu-Medrol 125 mg and DuoNeb x 1.  Did also receive doxycycline 100 mg IV x 1.  I do not suspect pneumonia at this moment and therefore I would not continue IV antibiotic therapy.  Will continue patient's PTA cefdinir which will be completed on 8/19.  Completed azithromycin 5 days course on 8/16.  Troponin level elevated.  This could be secondary to COPD with hypoxia versus non-ST segment elevation MI.  Will check a troponin level in a.m. x 1.  Echocardiogram ordered to evaluate for any wall motion abnormalities.  Patient on Plavix 75 mg daily, will continue.  She is also on  statin, continued.  Benign essential hypertension.  Continue PTA meds.  On losartan hydrochlorothiazide   History of CAD status post triple CABG  History of COPD, former smoker.  Will continue PTA albuterol inhaler and nebulization therapy as needed.  Completed short course of oral prednisone 8/16.  I will give 1 additional dose of Solu-Medrol tomorrow.  Prior to discharge home.  Hyponatremia, possibly due to diuretic therapy.  No  hypervolemia.  Hyperglycemia.  Suspect due to prednisone use.  Will check A1c.  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  Patient states she has been having a nonproductive cough.  She states she coughs and feels like she has phlegm in her throat that she was unable to bring up.  She has been using inhalers as needed.  A week ago she was prescribed azithromycin and cefdinir.  Completed azithromycin yesterday and taking cefdinir, he last dose today in the morning.  She will complete cefdinir 7 days course on 8/19.  Denies any fever.  No any chest pain except having chest soreness from repetitive cough.  Patient states she has been in and out of hospital almost every 2 months.  Last hospitalization was back in April.  In the ED, EKG done showed sinus rhythm with some nonspecific changes, there is no any ST segment elevation.  Initial high-sensitivity troponin was elevated mildly at 25 and repeat was 35.  Chest x-ray obtained is negative for pneumonia consolidation, pleural effusion or pulmonary edema.  She was given Solu-Medrol 125 mg and DuoNeb therapy and her symptoms completely resolved.  Due to the trending troponin decision was made to admit her for observation.  Echocardiogram ordered for a.m.         Past Medical History:     Past Medical History:   Diagnosis Date    Bronchitis 5/2014    Colitis     COPD (chronic obstructive pulmonary disease) (H)     Dyspnea on exertion     Heart attack (H) 1996    Hyperlipidaemia     Hypertension      Lumbar facet arthropathy     Noninfectious ileitis 5/2015    Bout of colitis.    Osteoarthritis     Stroke (H) 2012    NO DEFICITS    Uncomplicated asthma     Urinary frequency     Rx: Oxybutinin     Reviewed         Past Surgical History:     Past Surgical History:   Procedure Laterality Date    APPENDECTOMY      ARTHROPLASTY HIP ANTERIOR Right 6/23/2015    Procedure: ARTHROPLASTY HIP ANTERIOR;  Surgeon: Ozzie Alfredo MD;  Location:  OR    CARDIAC SURGERY  1996    3 Vessel CABG    ENT SURGERY      tonsillectomy    ENT SURGERY      blepharoplasty    EYE SURGERY      blepharoplasty    GENITOURINARY SURGERY      bladder repair    GYN SURGERY      hysterectomy    IR LUMBAR PUNCTURE  4/22/2013    ORTHOPEDIC SURGERY      L TALISHA    REVERSE ARTHROPLASTY SHOULDER Right 1/19/2021    Procedure: Right reverse total shoulder arthroplasty;  Surgeon: Nick Sanders MD;  Location:  OR              Social History:   Reviewed  Social History     Socioeconomic History    Marital status:      Spouse name: Not on file    Number of children: Not on file    Years of education: Not on file    Highest education level: Not on file   Occupational History    Not on file   Tobacco Use    Smoking status: Former    Smokeless tobacco: Never   Substance and Sexual Activity    Alcohol use: Yes     Comment: occasionally    Drug use: No    Sexual activity: Not on file   Other Topics Concern    Not on file   Social History Narrative    Not on file     Social Determinants of Health     Financial Resource Strain: Low Risk  (4/8/2024)    Received from [employer]    Financial Resource Strain     Difficulty of Paying Living Expenses: 3     Difficulty of Paying Living Expenses: Not on file   Food Insecurity: No Food Insecurity (4/8/2024)    Received from [employer]    Food Insecurity     Worried About Running Out of Food in the Last Year: 1   Transportation Needs: No  Transportation Needs (4/8/2024)    [facility]    Transportation Needs     Lack of Transportation (Medical): 1   Physical Activity: Not on file   Stress: Not on file   Social Connections: Socially Integrated (4/8/2024)    Received from [facility] [employer] [facility]    Social Connections     Frequency of Communication with Friends and Family: 0   Interpersonal Safety: Not on file   Housing Stability: Low Risk  (4/8/2024)    [facility] [employer] [facility]    Housing Stability     Unable to Pay for Housing in the Last Year: 1            Family History:   No any pertinent family history         Immunizations:     Immunization History   Administered Date(s) Administered    COVID-19 12+ (2023-24) (MODERNA) 09/29/2023    COVID-19 Bivalent 12+ (Pfizer) 09/22/2022    COVID-19 MONOVALENT 12+ (Pfizer) 02/03/2021, 02/24/2021, 10/01/2021    COVID-19 Monovalent 12+ (Pfizer 2022) 04/01/2022              Allergies:    No Known Allergies         Medications:     Current Outpatient Medications   Medication Instructions    acetaminophen (TYLENOL) 650 mg, Oral, EVERY 4 HOURS PRN    ACETAMINOPHEN -1,000 mg, Oral, DAILY PRN    albuterol (PROAIR HFA, PROVENTIL HFA, VENTOLIN HFA) 108 (90 BASE) MCG/ACT inhaler 1-2 puffs, Inhalation, EVERY 4 HOURS PRN    albuterol (PROVENTIL) 2.5 mg, Nebulization, EVERY 6 HOURS PRN    alendronate (FOSAMAX) 70 mg, Oral, EVERY 7 DAYS    celecoxib (CELEBREX) 200 mg, Oral, DAILY, Do not take within 6 hours of ibuprofen (MOTRIN, ADVIL) or ketorolac (TORADOL) if prescribed.    CLOPIDOGREL BISULFATE PO 75 mg, Oral, DAILY    fluticasone (FLOVENT HFA) 110 MCG/ACT inhaler 1 puff, Inhalation, 2 TIMES DAILY    GABAPENTIN -200 mg, Oral, AT BEDTIME PRN, 1-3 capsules per night     hydrOXYzine HCl (ATARAX) 10 mg, Oral, EVERY 6 HOURS PRN    ipratropium-albuterol (COMBIVENT RESPIMAT)  MCG/ACT inhaler 1 puff, Inhalation,  4 TIMES DAILY    losartan-hydrochlorothiazide (HYZAAR) 100-12.5 MG tablet 1 tablet, Oral, DAILY    oxyBUTYnin ER (DITROPAN XL) 10 mg, Oral, EVERY EVENING    senna-docusate (SENOKOT-S/PERICOLACE) 8.6-50 MG tablet 1-2 tablets, Oral, 2 TIMES DAILY, Take while on oral narcotics to prevent or treat constipation.    Simvastatin (ZOCOR PO) 40 mg, Oral, AT BEDTIME    traMADol (ULTRAM) 50 mg, Oral, EVERY 6 HOURS PRN              Review of Systems:   A complete ROS was performed and is negative other than what is stated in the HPI.          Physical Exam:   /83   Pulse 86   Temp 97.6  F (36.4  C)   Resp 20   Wt 56.7 kg (125 lb)   SpO2 93%   BMI 22.50 kg/m       GENERAL: Alert and oriented x 3. NAD.   HEENT: Anicteric sclera. Mucous membranes moist.   CV: RRR. S1, S2. No murmurs appreciated.   RESPIRATORY: Effort normal on room air. Lungs CTAB with no wheezing, rales, rhonchi.   GI: Abdomen soft and non distended with normoactive bowel sounds present in all quadrants. No tenderness, rebound, guarding.   NEUROLOGICAL: No focal deficits. Moves all extremities.    EXTREMITIES: No peripheral edema. Intact bilateral pedal pulses.   SKIN: No jaundice. No rashes.          Data:   CBC RESULTS:   Recent Labs   Lab Test 08/17/24  1703   WBC 8.8   RBC 4.26   HGB 13.1   HCT 39.8   MCV 93   MCH 30.8   MCHC 32.9   RDW 12.6      Last Comprehensive Metabolic Panel:  Lab Results   Component Value Date     (L) 08/17/2024    POTASSIUM 4.0 08/17/2024    CHLORIDE 96 (L) 08/17/2024    CO2 24 08/17/2024    ANIONGAP 14 08/17/2024     (H) 08/17/2024    BUN 24.4 (H) 08/17/2024    CR 0.69 08/17/2024    GFRESTIMATED 84 08/17/2024    BENEDICTO 9.3 08/17/2024     Liver Function Studies -   Recent Labs   Lab Test 07/23/16  0105   PROTTOTAL 7.0   ALBUMIN 3.8   BILITOTAL 0.3   ALKPHOS 64   AST 14   ALT 19         Discussed with the patient.  All her questions answered.  Patient verbalized understanding and agreement with plan.

## 2024-08-18 NOTE — ED PROVIDER NOTES
Emergency Department Note      History of Present Illness     Chief Complaint   Shortness of Breath      HPI   Azra Christine is a 89 year old female who presents to the ED with chief complaint of shortness of breath, significant cough and weakness for a week.  Patient reports that she has been fighting a COPD flareup for the past week and she has been taking prednisone as well as antibiotic, however today she had onset of severe worsening of her symptoms took a DuoNeb however this seemed to make her very anxious and she felt that she could not cough up her phlegm.  She reports that this has happened to her in the past as well.  She has had to be observed in the hospital however has never had to be intubated.  She denies any chest pain at all here in the ER and says she had no chest pain earlier today, no hemoptysis.  She denies any fever, vomiting or diarrhea.  reports that she has had severe symptoms before however today was the worst.     Independent Historian    as detailed above.    Review of External Notes   I reviewed the ED visit from 4/8/2024: Admitted for COPD exacerbation requiring oxygen supplementation, steroids, and nebs. Troponin was 23>24.     Past Medical History     Medical History and Problem List   Past Medical History:   Diagnosis Date    Bronchitis 5/2014    Colitis     COPD (chronic obstructive pulmonary disease) (H)     Dyspnea on exertion     Heart attack (H) 1996    Hyperlipidaemia     Hypertension     Lumbar facet arthropathy     Noninfectious ileitis 5/2015    Osteoarthritis     Stroke (H) 2012    Uncomplicated asthma     Urinary frequency        Medications   No current outpatient medications on file.      Surgical History   Past Surgical History:   Procedure Laterality Date    APPENDECTOMY      ARTHROPLASTY HIP ANTERIOR Right 6/23/2015    Procedure: ARTHROPLASTY HIP ANTERIOR;  Surgeon: Ozzie Alfredo MD;  Location: [facility] OR    CARDIAC SURGERY  1996    3 Vessel CABG    ENT  "SURGERY      tonsillectomy    ENT SURGERY      blepharoplasty    EYE SURGERY      blepharoplasty    GENITOURINARY SURGERY      bladder repair    GYN SURGERY      hysterectomy    IR LUMBAR PUNCTURE  4/22/2013    ORTHOPEDIC SURGERY      L TALISHA    REVERSE ARTHROPLASTY SHOULDER Right 1/19/2021    Procedure: Right reverse total shoulder arthroplasty;  Surgeon: Nikc Sanders MD;  Location:  OR       Physical Exam     Patient Vitals for the past 24 hrs:   BP Temp Temp src Pulse Resp SpO2 Height Weight   08/17/24 2233 (!) 151/71 97.9  F (36.6  C) Oral 88 20 93 % 1.6 m (5' 3\") 56.2 kg (123 lb 14.4 oz)   08/17/24 2207 -- -- -- -- -- 93 % -- --   08/17/24 2137 -- -- -- -- -- 94 % -- --   08/17/24 2116 -- -- -- -- -- 96 % -- --   08/17/24 2100 -- -- -- -- -- -- 1.6 m (5' 2.99\") --   08/17/24 2056 -- -- -- -- -- 93 % -- --   08/17/24 2040 -- -- -- -- -- 96 % -- --   08/17/24 2000 135/83 -- -- 86 20 93 % -- --   08/17/24 1953 -- -- -- -- -- 94 % -- --   08/17/24 1900 -- 97.6  F (36.4  C) -- -- -- 91 % -- --   08/17/24 1830 -- -- -- -- -- 99 % -- --   08/17/24 1800 -- -- -- -- -- 92 % -- --   08/17/24 1649 (!) 168/97 -- -- -- -- 95 % -- --   08/17/24 1647 (!) 168/97 -- -- 92 (!) 36 93 % -- 56.7 kg (125 lb)     Physical Exam  General: In moderate distress, becomes tachypneic with speaking   HEENT: Mucous membranes moist  Neuro: Alert, moving all extremities equally with intention  CV: Regular rate and rhythm, radial and DP pulses equal  Respiratory: Niccoli speaking, coarse productive sounding cough however unable to bring up any sputum, no stridor, expiratory wheezes in lung fields  Abdomen: Soft, without rigidity or rebound throughout  MSK: No rash, no unilateral lower extremity swelling, mild swelling over bilateral ankles    Diagnostics     Lab Results   Labs Ordered and Resulted from Time of ED Arrival to Time of ED Departure   BASIC METABOLIC PANEL - Abnormal       Result Value    Sodium 134 (*)     Potassium 4.0      " Chloride 96 (*)     Carbon Dioxide (CO2) 24      Anion Gap 14      Urea Nitrogen 24.4 (*)     Creatinine 0.69      GFR Estimate 84      Calcium 9.3      Glucose 209 (*)    CBC WITH PLATELETS AND DIFFERENTIAL - Abnormal    WBC Count 8.8      RBC Count 4.26      Hemoglobin 13.1      Hematocrit 39.8      MCV 93      MCH 30.8      MCHC 32.9      RDW 12.6      Platelet Count 287      % Neutrophils 85      % Lymphocytes 8      % Monocytes 6      % Eosinophils 0      % Basophils 0      % Immature Granulocytes 1      NRBCs per 100 WBC 0      Absolute Neutrophils 7.5      Absolute Lymphocytes 0.7 (*)     Absolute Monocytes 0.5      Absolute Eosinophils 0.0      Absolute Basophils 0.0      Absolute Immature Granulocytes 0.1      Absolute NRBCs 0.0     TROPONIN T, HIGH SENSITIVITY - Abnormal    Troponin T, High Sensitivity 25 (*)    TROPONIN T, HIGH SENSITIVITY - Abnormal    Troponin T, High Sensitivity 35 (*)    INFLUENZA A/B, RSV, & SARS-COV2 PCR - Normal    Influenza A PCR Negative      Influenza B PCR Negative      RSV PCR Negative      SARS CoV2 PCR Negative     NT PROBNP INPATIENT - Normal    N terminal Pro BNP Inpatient 856     D DIMER QUANTITATIVE - Normal    D-Dimer Quantitative 0.33         Imaging   XR Chest 2 Views   Final Result   IMPRESSION: Median sternotomy wires and CABG related surgical clips. Mildly enlarged heart. Small right pleural effusion. Mild pulmonary congestion. No acute fracture dislocation. Right shoulder arthroplasty.          EKG   EKG on my interpretation 1917  Sinus rhythm with first degree AV block and LBBB, no Sgarbossa criteria   Rate 91 W[place] >200  Qtc 474    Independent Interpretation   CXR shows right pleural effusion, rotated image, no focal consolidation    ED Course      Medications Administered   Medications   ondansetron (ZOFRAN ODT) ODT tab 4 mg (has no administration in time range)     Or   ondansetron (ZOFRAN) injection 4 mg (has no administration in time range)   enoxaparin  ANTICOAGULANT (LOVENOX) injection 40 mg (40 mg Subcutaneous $Given 8/17/24 2242)   albuterol (PROVENTIL) neb solution 2.5 mg (has no administration in time range)   albuterol (PROVENTIL HFA/VENTOLIN HFA) inhaler (has no administration in time range)   amLODIPine (NORVASC) tablet 5 mg (has no administration in time range)   cefdinir (OMNICEF) capsule 300 mg (300 mg Oral $Given 8/17/24 2241)   clopidogrel (PLAVIX) tablet 75 mg (has no administration in time range)   gabapentin (NEURONTIN) capsule 200 mg (200 mg Oral $Given 8/17/24 2241)   losartan-hydrochlorothiazide (HYZAAR) 100-12.5 mg combo dose (has no administration in time range)   senna-docusate (SENOKOT-S/PERICOLACE) 8.6-50 MG per tablet 1-2 tablet (1 tablet Oral $Given 8/17/24 2241)   psyllium (METAMUCIL/KONSYL) Packet 1 packet (has no administration in time range)   oxyBUTYnin ER (DITROPAN XL) 24 hr tablet 10 mg (10 mg Oral $Given 8/17/24 2241)   traMADol (ULTRAM) tablet 50 mg (has no administration in time range)   umeclidinium-vilanterol (ANORO ELLIPTA) 62.5-25 MCG/ACT oral inhaler 1 puff (has no administration in time range)   simvastatin (ZOCOR) tablet 40 mg (40 mg Oral $Given 8/17/24 2241)   ipratropium - albuterol 0.5 mg/2.5 mg/3 mL (DUONEB) neb solution 6 mL (6 mLs Nebulization $Given 8/17/24 1825)   methylPREDNISolone Na Suc (solu-MEDROL) injection 125 mg (125 mg Intravenous $Given 8/17/24 1759)   doxycycline hyclate (VIBRAMYCIN) capsule 100 mg (100 mg Oral $Given 8/17/24 2011)   aspirin (ASA) tablet 325 mg (325 mg Oral $Given 8/17/24 2011)       Procedures   Procedures     Discussion of Management   Admitting Hospitalist, Dr Molina     ED Course   ED Course as of 08/17/24 2996   Sat Aug 17, 2024   1833 Reassessed, patient is feeling better    1918 Reassessed, patient feels entirely back to normal   2036 Spoke to Dr Molina who has accepted patient        Additional Documentation  None    Medical Decision Making / Diagnosis     CMS Diagnoses:  None    MIPS       None    [facility]   Azra Christine is a 89 year old female with history of COPD, CAD s/p bypass in the past, who presents to the emergency department with a chief complaint of shortness of breath and productive cough although unable to bring up sputum.  Patient has been on prednisone for the past week and reportedly fighting off a COPD exacerbation, however this worsened today.  After patient received DuoNeb treatment and steroids she stated her symptoms completely resolved and her breathing felt back to baseline.  Given antibiotics for COPD exacerbation as well. EKG on my read shows sinus rhythm with a first-degree block no Sgarbossa criteria and patient denied any chest pain in the ED nor any chest pain at home, however troponins did uptrend greater than 7.  Given this, she is given aspirin and discussed with hospitalist Dr. Molina who kindly accepted for admission.     Disposition   The patient was admitted to the hospital.     Diagnosis     ICD-10-CM    1. COPD exacerbation (H)  J44.1       2. Troponin level elevated  R79.89            Discharge Medications   Current Discharge Medication List            MD Shayan Sauceda Connie, MD  08/18/24 0005

## 2024-08-19 ENCOUNTER — PATIENT OUTREACH (OUTPATIENT)
Dept: CARE COORDINATION | Facility: CLINIC | Age: 89
End: 2024-08-19
Payer: MEDICARE

## 2024-08-19 LAB
ATRIAL RATE - MUSE: 92 BPM
DIASTOLIC BLOOD PRESSURE - MUSE: NORMAL MMHG
INTERPRETATION ECG - MUSE: NORMAL
P AXIS - MUSE: NORMAL DEGREES
PR INTERVAL - MUSE: NORMAL MS
QRS DURATION - MUSE: 130 MS
QT - MUSE: 386 MS
QTC - MUSE: 474 MS
R AXIS - MUSE: 2 DEGREES
SYSTOLIC BLOOD PRESSURE - MUSE: NORMAL MMHG
T AXIS - MUSE: 119 DEGREES
VENTRICULAR RATE- MUSE: 91 BPM

## 2024-08-19 NOTE — PROGRESS NOTES
"Clinic Care Coordination Contact  Transitions of Care Outreach  Chief Complaint   Patient presents with    Clinic Care Coordination - Post Hospital       Most Recent Admission Date: 8/17/2024   Most Recent Admission Diagnosis:      Most Recent Discharge Date: 8/18/2024   Most Recent Discharge Diagnosis: COPD exacerbation (H) - J44.1  Troponin level elevated - R79.89     Transitions of Care Assessment    Discharge Assessment  How are you doing now that you are home?: \" I'm Fine \"  How are your symptoms? (Red Flag symptoms escalate to triage hotline per guidelines): Improved  Do you know how to contact your clinic care team if you have future questions or changes to your health status? : Yes  Does the patient have their discharge instructions? : Yes  Does the patient have questions regarding their discharge instructions? : No  Were you started on any new medications or were there changes to any of your previous medications? : Yes  Does the patient have all of their medications?: Yes  Do you have questions regarding any of your medications? : No  Do you have all of your needed medical supplies or equipment (DME)?  (i.e. oxygen tank, CPAP, cane, etc.): Yes    Post-op ([facility] CTA Only)  If the patient had a surgery or procedure, do they have any questions for a nurse?: No         [facility] Explained and offered Care Coordination support to eligible patients: No    Patient accepted? No    Follow up Plan     Discharge Follow-Up  Discharge follow up appointment scheduled in alignment with recommended follow up timeframe or Transitions of Risk Category? (Low = within 30 days; Moderate= within 14 days; High= within 7 days): Yes  Discharge Follow Up Appointment Date: 09/16/24  Discharge Follow Up Appointment Scheduled with?: Specialty Care Provider    No future appointments.    Outpatient Plan as outlined on AVS reviewed with patient.    For any urgent concerns, please contact our 24 hour nurse triage line: [phone] " ([phone])       Juliane Sales MA

## 2024-08-28 ENCOUNTER — TRANSCRIBE ORDERS (OUTPATIENT)
Dept: OTHER | Age: 89
End: 2024-08-28

## 2024-08-28 DIAGNOSIS — J44.9 COPD, MODERATE (H): Primary | ICD-10-CM

## 2024-10-13 ENCOUNTER — HEALTH MAINTENANCE LETTER (OUTPATIENT)
Age: 89
End: 2024-10-13

## (undated) DEVICE — SU STRATAFIX MONOCRYL 3-0 SPIRAL PS-1 45CM SXMP1B102

## (undated) DEVICE — GLOVE PROTEXIS BLUE W/NEU-THERA 7.5  2D73EB75

## (undated) DEVICE — SOL NACL 0.9% IRRIG 3000ML BAG 2B7477

## (undated) DEVICE — 3.2MM CENTRAL SCREW DRILL BIT

## (undated) DEVICE — SET HANDPIECE INTERPULSE W/COAXIAL FAN SPRAY TIP 0210118000

## (undated) DEVICE — GLOVE PROTEXIS POWDER FREE 7.5 ORTHOPEDIC 2D73ET75

## (undated) DEVICE — IMM SHOULDER MED 79-84015

## (undated) DEVICE — SPONGE LAP 18X18" X8435

## (undated) DEVICE — SYR BULB IRRIG 50ML LATEX FREE 0035280

## (undated) DEVICE — ESU BIPOLAR SEALER AQUAMANTYS 6MM 23-112-1

## (undated) DEVICE — ESU GROUND PAD ADULT W/CORD E7507

## (undated) DEVICE — BLADE SAW SAGITTAL STRK 25X90X1.27MM HD SYS 6 6125-127-090

## (undated) DEVICE — SU NDL MAYO TROCAR 217005

## (undated) DEVICE — PREP CHLORAPREP 26ML TINTED HI-LITE ORANGE 930815

## (undated) DEVICE — LINEN FULL SHEET 5511

## (undated) DEVICE — SUCTION TIP YANKAUER W/O VENT K86

## (undated) DEVICE — DRAPE LEGGINGS 8421

## (undated) DEVICE — Device

## (undated) DEVICE — SOL NACL 0.9% INJ 250ML BAG 2B1322Q

## (undated) DEVICE — GLOVE PROTEXIS BLUE W/NEU-THERA 7.0  2D73EB70

## (undated) DEVICE — DRAPE CONVERTORS U-DRAPE 60X72" 8476

## (undated) DEVICE — ESU PENCIL W/SMOKE EVAC CVPLP2000

## (undated) DEVICE — GLOVE PROTEXIS POWDER FREE 7.0 ORTHOPEDIC 2D73ET70

## (undated) DEVICE — PACK SHOULDER RIDGES

## (undated) DEVICE — PACK SET-UP STD 9102

## (undated) DEVICE — LINEN POUCH DBL 5427

## (undated) DEVICE — SU STRATAFIX PDS PLUS 0 CT-1 18" SXPP1A401

## (undated) DEVICE — SOLUTION WOUND CLEANSING 3/4OZ 10% PVP EA-L3011FB-50

## (undated) DEVICE — BAG CLEAR TRASH 1.3M 39X33" P4040C

## (undated) DEVICE — DRSG AQUACEL AG 3.5X6.0" HYDROFIBER 412010

## (undated) DEVICE — 2.7MM PERIPHERAL SCREW DRILL BIT

## (undated) DEVICE — SUTURE VICRYL+ 2-0 CT-2 27" UND VCP269H

## (undated) DEVICE — SU STRATAFIX PDS PLUS 1 CT-1 12" SXPP1A443

## (undated) DEVICE — DRSG STERI STRIP 1/2X4" R1547

## (undated) DEVICE — WRAP MCCONNELL ARM SUPPORT LG 12-401

## (undated) DEVICE — DRAPE C-ARM 60X42" 1013

## (undated) DEVICE — DRAPE SHOULDER PACK SPLITS 29365

## (undated) DEVICE — DRSG AQUACEL AG 3.5X9.75" HYDROFIBER 412011

## (undated) DEVICE — LINEN HALF SHEET 5512

## (undated) DEVICE — LINEN ORTHO ACL PACK 5447

## (undated) DEVICE — SUCTION MANIFOLD NEPTUNE 2 SYS 4 PORT 0702-020-000

## (undated) DEVICE — DRAPE IOBAN INCISE 23X17" 6650EZ

## (undated) DEVICE — SOL WATER IRRIG 1000ML BOTTLE 2F7114

## (undated) DEVICE — SUTURE VICRYL+ 0 CT-1 18" DYED VIO VCP740D

## (undated) DEVICE — DRAPE STERI TOWEL LG 1010

## (undated) RX ORDER — VANCOMYCIN HYDROCHLORIDE 1 G/20ML
INJECTION, POWDER, LYOPHILIZED, FOR SOLUTION INTRAVENOUS
Status: DISPENSED
Start: 2021-01-19

## (undated) RX ORDER — ONDANSETRON 2 MG/ML
INJECTION INTRAMUSCULAR; INTRAVENOUS
Status: DISPENSED
Start: 2021-01-19

## (undated) RX ORDER — IPRATROPIUM BROMIDE AND ALBUTEROL SULFATE 2.5; .5 MG/3ML; MG/3ML
SOLUTION RESPIRATORY (INHALATION)
Status: DISPENSED
Start: 2021-01-19

## (undated) RX ORDER — DEXAMETHASONE SODIUM PHOSPHATE 4 MG/ML
INJECTION, SOLUTION INTRA-ARTICULAR; INTRALESIONAL; INTRAMUSCULAR; INTRAVENOUS; SOFT TISSUE
Status: DISPENSED
Start: 2021-01-19

## (undated) RX ORDER — FENTANYL CITRATE-0.9 % NACL/PF 10 MCG/ML
PLASTIC BAG, INJECTION (ML) INTRAVENOUS
Status: DISPENSED
Start: 2021-01-19

## (undated) RX ORDER — PROPOFOL 10 MG/ML
INJECTION, EMULSION INTRAVENOUS
Status: DISPENSED
Start: 2021-01-19

## (undated) RX ORDER — ACETAMINOPHEN 325 MG/1
TABLET ORAL
Status: DISPENSED
Start: 2021-01-19

## (undated) RX ORDER — CEFAZOLIN SODIUM 2 G/100ML
INJECTION, SOLUTION INTRAVENOUS
Status: DISPENSED
Start: 2021-01-19

## (undated) RX ORDER — GLYCOPYRROLATE 0.2 MG/ML
INJECTION INTRAMUSCULAR; INTRAVENOUS
Status: DISPENSED
Start: 2021-01-19

## (undated) RX ORDER — FENTANYL CITRATE 50 UG/ML
INJECTION, SOLUTION INTRAMUSCULAR; INTRAVENOUS
Status: DISPENSED
Start: 2021-01-19

## (undated) RX ORDER — ALBUTEROL SULFATE 90 UG/1
AEROSOL, METERED RESPIRATORY (INHALATION)
Status: DISPENSED
Start: 2021-01-19

## (undated) RX ORDER — TRANEXAMIC ACID 650 MG/1
TABLET ORAL
Status: DISPENSED
Start: 2021-01-19

## (undated) RX ORDER — CELECOXIB 200 MG/1
CAPSULE ORAL
Status: DISPENSED
Start: 2021-01-19

## (undated) RX ORDER — LIDOCAINE HYDROCHLORIDE 10 MG/ML
INJECTION, SOLUTION EPIDURAL; INFILTRATION; INTRACAUDAL; PERINEURAL
Status: DISPENSED
Start: 2021-01-19